# Patient Record
Sex: MALE | Race: WHITE | NOT HISPANIC OR LATINO | Employment: STUDENT | URBAN - METROPOLITAN AREA
[De-identification: names, ages, dates, MRNs, and addresses within clinical notes are randomized per-mention and may not be internally consistent; named-entity substitution may affect disease eponyms.]

---
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
## 2024-02-07 DIAGNOSIS — M25.562 LEFT KNEE PAIN, UNSPECIFIED CHRONICITY: Primary | ICD-10-CM

## 2024-02-07 NOTE — PROGRESS NOTES
History of Present Illness   He is here today for evaluation of left knee pain.  He states his pain has been going on for about 4 years.  He has not had any treatment.  He states this is an old injury when he was at [employer] running and slipped falling on his back and hyperflexing his left leg.  Since then he gets pain over the anterior knee around the patella and patellar tendons.  He states he is able to play sports however if he play sports for a day he cannot play for 3 weeks.  He has tried some ibuprofen with minimal relief.  He does note some catching and locking sensations when he is laying in bed at night.     Review of Systems   GENERAL: Negative  GI: Negative  MUSCULOSKELETAL: See HPI  SKIN: Negative  NEURO:  Negative     Physical Exam  Left knee  Skin is intact without any evidence of erythema ecchymosis or effusion  No tenderness to palpation over bony landmarks or patellar tendon however he isolates the pain when he gets it over the anterior knee and patellar tendon areas  Range of motion is 0 to 130 degrees  The knee is stable in varus valgus and anterior posterior stresses  Mildly positive Farhad's test  He is distally neurovascularly intact.     Imaging  XR knee left 4+ views  Status: Final result     PACS Images - [id number]     Show images for XR knee left 4+ views  In Basket Actions    Done  Result Mgmt     View in In Basket  Signed by    Signed Time Phone Pager   Dillon Garrett MD 2/08/2024 09:49 [phone]      Exam Information    Status Exam Begun Exam Ended   Final 2/08/2024 08:18 2/08/2024 08:24     Study Result    Narrative & Impression   Interpreted By:  Dillon Garrett,   STUDY:  XR KNEE LEFT 4+ VIEWS;  ;  2/8/2024 8:24 am      INDICATION:  Signs/Symptoms:pain.      ACCESSION NUMBER(S):  [id number]      ORDERING CLINICIAN:  DILLON GARRETT      FINDINGS:  Left knee films are negative for fracture, dislocation or destructive  lesion. The joint spaces are well maintained. No soft tissue  abnormality is  identified.          Signed by: Dillon Garrett 2/8/2024 9:49 AM  Dictation workstation:   [id number]        Assessment   Left knee pain  Left knee internal derangement     Plan  Left knee MRI  Follow-up after the MRI to go over results and formulate treatment plan.  All questions answered.

## 2024-02-08 ENCOUNTER — HOSPITAL ENCOUNTER (OUTPATIENT)
Dept: RADIOLOGY | Facility: CLINIC | Age: 19
Discharge: HOME | End: 2024-02-08
Payer: COMMERCIAL

## 2024-02-08 ENCOUNTER — OFFICE VISIT (OUTPATIENT)
Dept: ORTHOPEDIC SURGERY | Facility: CLINIC | Age: 19
End: 2024-02-08
Payer: COMMERCIAL

## 2024-02-08 DIAGNOSIS — M23.92 INTERNAL DERANGEMENT OF LEFT KNEE: ICD-10-CM

## 2024-02-08 DIAGNOSIS — M25.562 LEFT KNEE PAIN, UNSPECIFIED CHRONICITY: ICD-10-CM

## 2024-02-08 DIAGNOSIS — G89.29 CHRONIC PAIN OF LEFT KNEE: Primary | ICD-10-CM

## 2024-02-08 DIAGNOSIS — M25.562 CHRONIC PAIN OF LEFT KNEE: Primary | ICD-10-CM

## 2024-02-08 PROCEDURE — 73564 X-RAY EXAM KNEE 4 OR MORE: CPT | Mod: LT

## 2024-02-08 PROCEDURE — 1036F TOBACCO NON-USER: CPT | Performed by: ORTHOPAEDIC SURGERY

## 2024-02-08 PROCEDURE — 99203 OFFICE O/P NEW LOW 30 MIN: CPT | Performed by: ORTHOPAEDIC SURGERY

## 2024-02-08 PROCEDURE — 73564 X-RAY EXAM KNEE 4 OR MORE: CPT | Mod: LEFT SIDE | Performed by: ORTHOPAEDIC SURGERY

## 2024-02-08 PROCEDURE — 99213 OFFICE O/P EST LOW 20 MIN: CPT | Mod: 25,27 | Performed by: ORTHOPAEDIC SURGERY

## 2024-02-23 ENCOUNTER — HOSPITAL ENCOUNTER (OUTPATIENT)
Dept: RADIOLOGY | Facility: CLINIC | Age: 19
Discharge: HOME | End: 2024-02-23
Payer: COMMERCIAL

## 2024-02-23 DIAGNOSIS — G89.29 CHRONIC PAIN OF LEFT KNEE: ICD-10-CM

## 2024-02-23 DIAGNOSIS — M23.92 INTERNAL DERANGEMENT OF LEFT KNEE: ICD-10-CM

## 2024-02-23 DIAGNOSIS — M25.562 CHRONIC PAIN OF LEFT KNEE: ICD-10-CM

## 2024-02-23 PROCEDURE — 73721 MRI JNT OF LWR EXTRE W/O DYE: CPT | Mod: LEFT SIDE | Performed by: RADIOLOGY

## 2024-02-23 PROCEDURE — 73721 MRI JNT OF LWR EXTRE W/O DYE: CPT | Mod: LT

## 2024-02-29 ENCOUNTER — OFFICE VISIT (OUTPATIENT)
Dept: ORTHOPEDIC SURGERY | Facility: CLINIC | Age: 19
End: 2024-02-29
Payer: COMMERCIAL

## 2024-02-29 DIAGNOSIS — M17.12 OSTEOARTHRITIS OF LEFT PATELLOFEMORAL JOINT: Primary | ICD-10-CM

## 2024-02-29 PROCEDURE — 99214 OFFICE O/P EST MOD 30 MIN: CPT | Performed by: ORTHOPAEDIC SURGERY

## 2024-02-29 PROCEDURE — 1036F TOBACCO NON-USER: CPT | Performed by: ORTHOPAEDIC SURGERY

## 2024-02-29 PROCEDURE — 20610 DRAIN/INJ JOINT/BURSA W/O US: CPT | Mod: LT | Performed by: ORTHOPAEDIC SURGERY

## 2024-02-29 PROCEDURE — 2500000004 HC RX 250 GENERAL PHARMACY W/ HCPCS (ALT 636 FOR OP/ED): Performed by: ORTHOPAEDIC SURGERY

## 2024-02-29 PROCEDURE — 2500000005 HC RX 250 GENERAL PHARMACY W/O HCPCS: Performed by: ORTHOPAEDIC SURGERY

## 2024-02-29 RX ORDER — BETAMETHASONE SODIUM PHOSPHATE AND BETAMETHASONE ACETATE 3; 3 MG/ML; MG/ML
1 INJECTION, SUSPENSION INTRA-ARTICULAR; INTRALESIONAL; INTRAMUSCULAR; SOFT TISSUE
Status: COMPLETED | OUTPATIENT
Start: 2024-02-29 | End: 2024-02-29

## 2024-02-29 RX ORDER — LIDOCAINE HYDROCHLORIDE 10 MG/ML
1 INJECTION INFILTRATION; PERINEURAL
Status: COMPLETED | OUTPATIENT
Start: 2024-02-29 | End: 2024-02-29

## 2024-02-29 RX ORDER — MELOXICAM 15 MG/1
15 TABLET ORAL DAILY
Qty: 30 TABLET | Refills: 0 | Status: SHIPPED | OUTPATIENT
Start: 2024-02-29 | End: 2024-03-25

## 2024-02-29 RX ADMIN — BETAMETHASONE SODIUM PHOSPHATE AND BETAMETHASONE ACETATE 1 MG: 3; 3 INJECTION, SUSPENSION INTRA-ARTICULAR; INTRALESIONAL; INTRAMUSCULAR at 15:55

## 2024-02-29 RX ADMIN — LIDOCAINE HYDROCHLORIDE 1 ML: 10 INJECTION, SOLUTION INFILTRATION; PERINEURAL at 15:55

## 2024-02-29 NOTE — Clinical Note
February 29, 2024     Patient: Mal Coleman   YOB: 2005   Date of Visit: 2/29/2024       To Whom It May Concern:    It is my medical opinion that Mal Coleman {Work release (duty restriction):00238}.    If you have any questions or concerns, please don't hesitate to call.         Sincerely,        Dillon Garrett MD    CC: No Recipients

## 2024-02-29 NOTE — PROGRESS NOTES
History of Present Illness   The patient is here for follow-up of his left knee MRI.  He complains of continued pain and swelling in the left knee.     Review of Systems   GENERAL: Negative for malaise, significant weight loss, fever  MUSCULOSKELETAL: see HPI  NEURO:  Negative     Physical Exam  Left knee  The skin is intact, the extensor mechanism is intact.  There is an mild effusion, no erythema or warmth.  Range of motion: 0-130 degrees  There is some anterior knee pain on palpation.  There is no instability varus or valgus stress knee at 0 and 30 degrees     Imaging  MR knee left wo IV contrast  Status: Final result     PACS Images - [id number]     Show images for MR knee left wo IV contrast  Signed by    Signed Time Phone Pager   Hermann Farias MD 2/23/2024 09:32 [phone]     Exam Information    Status Exam Begun Exam Ended   Final 2/23/2024 08:27 2/23/2024 08:28     Study Result    Narrative & Impression   Interpreted By:  Hermann Farias,   STUDY:  MRI of the  left knee  without contrast dated  2/23/2024.      INDICATION:  Signs/Symptoms:pain      COMPARISON:  None.      ACCESSION NUMBER(S):  [id number]      ORDERING CLINICIAN:  MARY KING      TECHNIQUE:  Multiplanar multisequence MRI of the  left knee was performed  without intravenous contrast.      FINDINGS:  MUSCLES, TENDONS, AND LIGAMENTS:      The anterior cruciate ligament is intact.  The posterior cruciate  ligament is intact. The medial collateral ligament is intact.The  lateral collateral ligament complex is intact. The popliteus and  biceps femoris tendons, iliotibial band, and extensor mechanism are  intact.      MENISCI:      The medial meniscus is intact.  The lateral meniscus is intact.      OSSEOUS STRUCTURES AND JOINTS:      Reactive marrow changes are seen in the anterior lateral femoral  trochlea without associated macrotrabecular fracture line evident.  There appear to be some associated foci of irregularity to  the  subchondral bone plate with small foci of subchondral cystic change  and marginal lateral trochlear osteophyte formation there is  overlying full-thickness hyaline articular cartilage loss of the  periphery of the lateral femoral trochlea. This appears to measure in  an area of approximately 90 millimeter squared as seen image 12 in  the coronal plane. There is appositional moderate thinning of the  hyaline articular cartilage of the lateral facet of the patella  possibly with some foci of full-thickness fissuring. There is  narrowing of the lateral compartment of the patellofemoral joint  space with lateral tilting of the patella and uncovering of the  lateral facet of the patella. The tibial tubercle to trochlear groove  distance measures approximately 2 cm which is in the elevated range.  Hyaline articular cartilage of the femorotibial joint spaces is  intact. There is a small volume knee joint effusion. Intracapsular  osteochondral bodies are seen in the posterior knee joint with a  relatively large body in the infrapopliteal recess of the joint as  seen image 9 of the axial 12 of the sagittal and 35 of the coronal  planes. Reactive marrow changes seen in the anterior tibial plateau  centrally deep to the anterior cruciate ligament and anterior horn  root ligament junction of the lateral meniscus likely representing  intraosseous ganglion cyst formation.      SOFT TISSUES:      No significant volume of fluid is evident in a popliteal cyst. There  is a multilobulated septated ganglion cyst at the anterior knee at  the level of the intercondylar notch extending slightly laterally  measuring up to approximately 1.1 x 2.7 x 0.5 cm.      IMPRESSION:  1. Prominent region of full-thickness loss of hyaline articular  cartilage of the lateral femoral trochlea with appositional thinning  of hyaline articular cartilage in the lateral facet of the patella,  narrowing the lateral compartment of the patellofemoral joint  space  with lateral tilting of the patella and uncovering of the lateral  facet. The tibial tubercle to trochlear groove distance is elevated.  The constellation of these findings can be seen with disorder of  patellar maltracking.  2. Small volume knee joint effusion with intracapsular osteochondral  bodies.  3. Ganglion cyst at the anterior knee at the level of the  intercondylar notch.      Signed by: Hermann Farias 2/23/2024 9:32 AM  Dictation workstation:   [id number]        Assessment   Patellofemoral osteoarthritis     Plan:  I reviewed the MRI with the patient and his mother who are present.  I we will arrange a referral to my partner Dr. Yoon for PFOA  I offered a corticosteroid injection today which she wished to try  I advised him against playing baseball until further notice  Mobic    L Inj/Asp: L knee on 2/29/2024 3:55 PM  Indications: pain  Details: 21 G needle, anterolateral approach  Medications: 1 mL lidocaine 10 mg/mL (1 %); 1 mg betamethasone acet,sod phos 6 mg/mL  Outcome: tolerated well, no immediate complications  Procedure, treatment alternatives, risks and benefits explained, specific risks discussed. Consent was given by the patient. Immediately prior to procedure a time out was called to verify the correct patient, procedure, equipment, support staff and site/side marked as required. Patient was prepped and draped in the usual sterile fashion.       Questions answered

## 2024-02-29 NOTE — LETTER
February 29, 2024     Patient: Mal Coleman   YOB: 2005   Date of Visit: 2/29/2024       To Whom it May Concern:    Mal Coleman was seen in my clinic on 2/29/2024. He  is not able to participate in Baseball until further notice .    If you have any questions or concerns, please don't hesitate to call.         Sincerely,     Dillon Garrett MD        CC: No Recipients

## 2024-02-29 NOTE — LETTER
February 29, 2024     Patient: Mal Coleman   YOB: 2005   Date of Visit: 2/29/2024       To Whom It May Concern:    Mal Coleman was seen in my clinic on 2/29/2024 at 3:45 pm. Please excuse Mal for his absence from work on this day to make the appointment.    If you have any questions or concerns, please don't hesitate to call.         Sincerely,         iDllon Garrett MD        CC: No Recipients

## 2024-03-04 ENCOUNTER — OFFICE VISIT (OUTPATIENT)
Dept: ORTHOPEDIC SURGERY | Facility: CLINIC | Age: 19
End: 2024-03-04
Payer: COMMERCIAL

## 2024-03-04 DIAGNOSIS — M17.12 OSTEOARTHRITIS OF LEFT PATELLOFEMORAL JOINT: Primary | ICD-10-CM

## 2024-03-04 PROCEDURE — 1036F TOBACCO NON-USER: CPT | Performed by: ORTHOPAEDIC SURGERY

## 2024-03-04 PROCEDURE — 99214 OFFICE O/P EST MOD 30 MIN: CPT | Performed by: ORTHOPAEDIC SURGERY

## 2024-03-04 NOTE — PROGRESS NOTES
History of Present Illness   Chief Complaint   Patient presents with    Left Knee - New Patient Visit     PFOA ref by FS  S/p zainab inj by FS on 2/29/24       History of Present Illness   Patient has had moderate aching pain and soreness in the left knee off and on for years.  He had a remote injury but he really has been more consistent in the last several months.  He recently requires injections for short period of relief and is feeling better.  He has a grinding type sensation he is reproducibly swells up.  In the past he had reproducible mechanical issues and swelling states it takes 5 to 7 days of near complete rest is to get his knee feeling better  Imaging  Radiographs Knee: No acute fractures or dislocations.  No arthritic change and well-preserved joint space  MRI: Early lateral patellofemoral arthritis with cartilage loss over the lateral facet of the trochlea.  TT-TG is 22  Assessment:   Left knee patellofemoral malalignment with early cartilage loss in the lateral trochlea    Plan:  We reviewed the role of imaging, physical therapy, injections and the time frame to healing and correlation with outcome.  Recommended surgery for left knee arthroscopy with open tibial tubercle osteotomy/distal and proximal patellofemoral realignment    Risks benefits and alternatives to surgery were discussed including but not limited to Infection, bleeding, neurovascular injury, pain and dysfunction, hardware related complications including cutout failure breakage, loss of function, motion, and permanent disability as well as the cardiovascular and pulmonary complications from anesthesia including death and DVT. Patient and family accept these risks.  We discussed specifically hardware complication with a cut out, failure, breakage, nonunion, malunion and need for future revision surgeries including incomplete pain relief and need for future cartilage restoration as well as patellofemoral surgeries or hardware  removal      Physical Exam  Well-nourished, well-developed. No acute distress. Alert and oriented x3. Responds appropriately to questioning. Good mood. Normal affect.  Physical Exam  Left knee:  Skin healthy and intact  No gross swelling or ecchymosis  Trace to 1+ Effusion: Crepitance with range of motion  ROM: 120.  Moderate crepitus with some pain with patellar grind.  Has pain along the inferior pole patella  Positive Farhad´s test/PositiveApley Grind  Neurovascular exam normal distally  Palpable pedal pulse and good cap refill     Review of Systems   GENERAL: Negative for malaise, significant weight loss, fever  MUSCULOSKELETAL: See HPI  NEURO:  Negative     Past Medical History:   Diagnosis Date    Left knee pain 2023    Sinusitis 2023       Medication Documentation Review Audit       Reviewed by Sonam Guerra on 24 at 0827      Medication Order Taking? Sig Documenting Provider Last Dose Status   methylphenidate (Concerta) 36 mg daily tablet [id number] No Take 1 tablet (36 mg) by mouth once daily. Historical Provider, MD Taking Active   methylphenidate (Concerta) 36 mg daily tablet [id number]  Take 1 tablet (36 mg) by mouth once daily in the morning. Do not crush, chew, or split. Ana Gonzales MD   23 2359   methylphenidate (Concerta) 36 mg daily tablet [id number]  Take 1 tablet (36 mg) by mouth once daily in the morning. Do not crush, chew, or split.  Do not start before May 24, 2023. Ana Gonzales MD   23 2359   methylphenidate (Concerta) 36 mg daily tablet [id number]  Take 1 tablet (36 mg) by mouth once daily in the morning. Do not crush, chew, or split.  Do not start before 2023. Ana Gonzales MD   23 2359   methylphenidate (Concerta) 36 mg daily tablet [id number]  Take 1 tablet (36 mg) by mouth once daily in the morning. Do not crush, chew, or split. Ana Gonzales MD   23 2359   methylphenidate (Concerta) 36 mg daily tablet  [id number]  Take 1 tablet (36 mg) by mouth once daily in the morning. Do not crush, chew, or split. Do not start before 2023. Ana Gonzales MD   10/29/23 235   methylphenidate (Concerta) 36 mg daily tablet [id number]  Take 1 tablet (36 mg) by mouth once daily in the morning. Do not crush, chew, or split. Do not start before 2023. Ana Gonzales MD   23 235                    No Known Allergies    Social History     Socioeconomic History    Marital status: Single     Spouse name: Not on file    Number of children: Not on file    Years of education: Not on file    Highest education level: Not on file   Occupational History    Not on file   Tobacco Use    Smoking status: Never     Passive exposure: Never    Smokeless tobacco: Never   Substance and Sexual Activity    Alcohol use: Never    Drug use: Never    Sexual activity: Not on file   Other Topics Concern    Not on file   Social History Narrative    Not on file     Social Determinants of Health     Financial Resource Strain: Not on file   Food Insecurity: Not on file   Transportation Needs: Not on file   Physical Activity: Not on file   Stress: Not on file   Social Connections: Not on file   Intimate Partner Violence: Not on file   Housing Stability: Not on file       No past surgical history on file.    MR knee left wo IV contrast    Result Date: 2024  Interpreted By:  Hermann Farias, STUDY: MRI of the  left knee  without contrast dated  2024.   INDICATION: Signs/Symptoms:pain   COMPARISON: None.   ACCESSION NUMBER(S): [id number]   ORDERING CLINICIAN: MARY KING   TECHNIQUE: Multiplanar multisequence MRI of the  left knee was performed without intravenous contrast.   FINDINGS: MUSCLES, TENDONS, AND LIGAMENTS:   The anterior cruciate ligament is intact.  The posterior cruciate ligament is intact. The medial collateral ligament is intact.The lateral collateral ligament complex is intact. The popliteus and  biceps femoris tendons, iliotibial band, and extensor mechanism are intact.   MENISCI:   The medial meniscus is intact.  The lateral meniscus is intact.   OSSEOUS STRUCTURES AND JOINTS:   Reactive marrow changes are seen in the anterior lateral femoral trochlea without associated macrotrabecular fracture line evident. There appear to be some associated foci of irregularity to the subchondral bone plate with small foci of subchondral cystic change and marginal lateral trochlear osteophyte formation there is overlying full-thickness hyaline articular cartilage loss of the periphery of the lateral femoral trochlea. This appears to measure in an area of approximately 90 millimeter squared as seen image 12 in the coronal plane. There is appositional moderate thinning of the hyaline articular cartilage of the lateral facet of the patella possibly with some foci of full-thickness fissuring. There is narrowing of the lateral compartment of the patellofemoral joint space with lateral tilting of the patella and uncovering of the lateral facet of the patella. The tibial tubercle to trochlear groove distance measures approximately 2 cm which is in the elevated range. Hyaline articular cartilage of the femorotibial joint spaces is intact. There is a small volume knee joint effusion. Intracapsular osteochondral bodies are seen in the posterior knee joint with a relatively large body in the infrapopliteal recess of the joint as seen image 9 of the axial 12 of the sagittal and 35 of the coronal planes. Reactive marrow changes seen in the anterior tibial plateau centrally deep to the anterior cruciate ligament and anterior horn root ligament junction of the lateral meniscus likely representing intraosseous ganglion cyst formation.   SOFT TISSUES:   No significant volume of fluid is evident in a popliteal cyst. There is a multilobulated septated ganglion cyst at the anterior knee at the level of the intercondylar notch extending  slightly laterally measuring up to approximately 1.1 x 2.7 x 0.5 cm.       1. Prominent region of full-thickness loss of hyaline articular cartilage of the lateral femoral trochlea with appositional thinning of hyaline articular cartilage in the lateral facet of the patella, narrowing the lateral compartment of the patellofemoral joint space with lateral tilting of the patella and uncovering of the lateral facet. The tibial tubercle to trochlear groove distance is elevated. The constellation of these findings can be seen with disorder of patellar maltracking. 2. Small volume knee joint effusion with intracapsular osteochondral bodies. 3. Ganglion cyst at the anterior knee at the level of the intercondylar notch.   Signed by: Hermann Farias 2/23/2024 9:32 AM Dictation workstation:   [id number]    XR knee left 4+ views    Result Date: 2/8/2024  Interpreted By:  Dillon Garrett, STUDY: XR KNEE LEFT 4+ VIEWS;  ;  2/8/2024 8:24 am   INDICATION: Signs/Symptoms:pain.   ACCESSION NUMBER(S): [id number]   ORDERING CLINICIAN: DILLON GARRETT   FINDINGS: Left knee films are negative for fracture, dislocation or destructive lesion. The joint spaces are well maintained. No soft tissue abnormality is identified.     Signed by: Dillon Garrett 2/8/2024 9:49 AM Dictation workstation:   [id number]

## 2024-03-04 NOTE — LETTER
March 4, 2024     Patient: Mal Coleman   YOB: 2005   Date of Visit: 3/4/2024       To Whom It May Concern:    It is my medical opinion that Mal Coleman  will have surgery on 4/2/24 and he will be out of work for 2-3 months after .    If you have any questions or concerns, please don't hesitate to call.         Sincerely,        Russ Yoon MD    CC: No Recipients

## 2024-03-08 ENCOUNTER — HOSPITAL ENCOUNTER (OUTPATIENT)
Facility: HOSPITAL | Age: 19
Setting detail: OUTPATIENT SURGERY
End: 2024-03-08
Attending: ORTHOPAEDIC SURGERY | Admitting: ORTHOPAEDIC SURGERY
Payer: COMMERCIAL

## 2024-03-08 PROBLEM — M23.8X2 OTHER INTERNAL DERANGEMENTS OF LEFT KNEE: Status: ACTIVE | Noted: 2024-03-07

## 2024-04-12 ENCOUNTER — APPOINTMENT (OUTPATIENT)
Dept: ORTHOPEDIC SURGERY | Facility: CLINIC | Age: 19
End: 2024-04-12
Payer: COMMERCIAL

## 2024-09-16 ENCOUNTER — APPOINTMENT (OUTPATIENT)
Dept: PEDIATRICS | Facility: CLINIC | Age: 19
End: 2024-09-16
Payer: COMMERCIAL

## 2024-09-16 VITALS
DIASTOLIC BLOOD PRESSURE: 70 MMHG | TEMPERATURE: 98.1 F | HEART RATE: 59 BPM | SYSTOLIC BLOOD PRESSURE: 119 MMHG | RESPIRATION RATE: 18 BRPM | HEIGHT: 71 IN | BODY MASS INDEX: 28.84 KG/M2 | WEIGHT: 206 LBS

## 2024-09-16 DIAGNOSIS — Z00.129 ENCOUNTER FOR ROUTINE CHILD HEALTH EXAMINATION WITHOUT ABNORMAL FINDINGS: Primary | ICD-10-CM

## 2024-09-16 DIAGNOSIS — F90.0 ATTENTION DEFICIT HYPERACTIVITY DISORDER (ADHD), PREDOMINANTLY INATTENTIVE TYPE: ICD-10-CM

## 2024-09-16 DIAGNOSIS — Z00.00 HEALTH CARE MAINTENANCE: ICD-10-CM

## 2024-09-16 PROCEDURE — 1036F TOBACCO NON-USER: CPT | Performed by: PEDIATRICS

## 2024-09-16 PROCEDURE — 3008F BODY MASS INDEX DOCD: CPT | Performed by: PEDIATRICS

## 2024-09-16 PROCEDURE — 99395 PREV VISIT EST AGE 18-39: CPT | Performed by: PEDIATRICS

## 2024-09-16 RX ORDER — METHYLPHENIDATE HYDROCHLORIDE 36 MG/1
36 TABLET ORAL EVERY MORNING
Qty: 30 TABLET | Refills: 0 | Status: SHIPPED | OUTPATIENT
Start: 2024-09-16 | End: 2024-10-16

## 2024-09-16 RX ORDER — METHYLPHENIDATE HYDROCHLORIDE 36 MG/1
36 TABLET ORAL EVERY MORNING
Qty: 30 TABLET | Refills: 0 | Status: SHIPPED | OUTPATIENT
Start: 2024-10-16 | End: 2024-11-15

## 2024-09-16 RX ORDER — METHYLPHENIDATE HYDROCHLORIDE 36 MG/1
36 TABLET ORAL EVERY MORNING
Qty: 30 TABLET | Refills: 0 | Status: SHIPPED | OUTPATIENT
Start: 2024-11-15 | End: 2024-12-15

## 2024-09-16 NOTE — PROGRESS NOTES
Subjective   Mal is a 19 y.o. male who presents today for his Health Maintenance and Supervision Exam.    General Health:  Mal is overall in good health.  Concerns today: Yes- wants to restart Concerta.    Nutrition:  Balanced diet? Yes    Elimination:  Elimination patterns appropriate: Yes    Sleep:  Sleep patterns appropriate? 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   HENT:      Right Ear: Tympanic membrane normal.      Left Ear: Tympanic membrane normal.      Nose: Nose normal.      Mouth/Throat:      Pharynx: Oropharynx is clear.   Eyes:      Extraocular Movements: Extraocular movements intact.      Conjunctiva/sclera: Conjunctivae normal.      Pupils: Pupils are equal, round, and reactive to light.   Cardiovascular:      Rate and Rhythm: Regular rhythm.      Heart sounds: Normal heart sounds.   Pulmonary:      Breath sounds: Normal breath sounds.   Abdominal:      General: Abdomen is flat. Bowel sounds are normal.      Palpations: Abdomen is soft.   Musculoskeletal:         General: Normal range of motion.   Skin:     General: Skin is warm.   Neurological:      General: No focal deficit present.      Mental Status: He is alert.   Psychiatric:         Mood and Affect: Mood normal.         Assessment/Plan   Healthy 19 y.o. male child.  1. Anticipatory guidance discussed.  Safety topics reviewed.  2. No orders of the defined types were placed in this encounter.    3. Follow-up visit in 1 year for next well child visit, or sooner as needed.   4. Immunizations per order   5.Depression screen reviewed    Refilled concerta 36 mg  Sent 3 rx    He has left knee surgery scheduled for 10/30/24

## 2024-09-17 PROBLEM — M22.2X2 PATELLOFEMORAL DISORDERS, LEFT KNEE: Status: ACTIVE | Noted: 2024-09-17

## 2024-09-30 NOTE — PROGRESS NOTES
Chief Complaint   Patient presents with    Left Knee - Pre-op Visit     Arthroscopy w/ open tibial tuberales osteotomy  DOS: 10/30/24       History of Present Illness:   He notes that he has had moderate aching pain and soreness in the left knee off and on for years.  He had a remote injury but he really has been more consistent in the last several months.  He recently requires injections for short period of relief and is feeling better.  He has a grinding type sensation he is reproducibly swells up.  In the past he had reproducible mechanical issues and swelling states it takes 5 to 7 days of near complete rest is to get his knee feeling better    Imaging:  Radiographs Knee: No acute fractures or dislocations.  No arthritic change and well-preserved joint space  MRI: Early lateral patellofemoral arthritis with cartilage loss over the lateral facet of the trochlea.  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    Risks benefits and alternatives to surgery were discussed including but not limited to Infection, bleeding, neurovascular injury, pain and dysfunction, hardware related complications including cutout failure breakage, loss of function, motion, and permanent disability as well as the cardiovascular and pulmonary complications from anesthesia including death and DVT. Patient and family accept these risks.  We discussed specifically hardware complication with a cut out, failure, breakage, nonunion, malunion and need for future revision surgeries including incomplete pain relief and need for future cartilage restoration as well as patellofemoral surgeries or hardware removal    Physical Exam:  Well-nourished, well-developed. No acute distress. Alert and oriented x3. Responds appropriately to  questioning. Good mood. Normal affect.  Physical Exam  Left knee:  Skin healthy and intact  No gross swelling or ecchymosis  Trace to 1+ Effusion: Crepitance with range of motion  ROM: 120.  Moderate crepitus with some pain with patellar grind.  Has pain along the inferior pole patella  Positive Farhad´s test/PositiveApley Grind  Neurovascular exam normal distally  Palpable pedal pulse and good cap refill     Review of Systems:  GENERAL: Negative for malaise, significant weight loss, fever  MUSCULOSKELETAL: See HPI  NEURO:  Negative     Past Medical History:   Diagnosis Date    Left knee pain 04/24/2023    Sinusitis 04/24/2023       Medication Documentation Review Audit       Reviewed by Sheila Mckeon LPN (Licensed Nurse) on 09/16/24 at 1646      Medication Order Taking? Sig Documenting Provider Last Dose Status   meloxicam (Mobic) 15 mg tablet [id number]  TAKE 1 TABLET BY MOUTH EVERY DAY Peyman Mayes PA-C  Active   methylphenidate (Concerta) 36 mg daily tablet [id number] No Take 1 tablet (36 mg) by mouth once daily. Historical Provider, MD Taking Active     Discontinued 09/16/24 1644     Discontinued 09/16/24 1644     Discontinued 09/16/24 1644     Discontinued 09/16/24 1644     Discontinued 09/16/24 1644     Discontinued 09/16/24 1644                    No Known Allergies    Social History     Socioeconomic History    Marital status: Single     Spouse name: Not on file    Number of children: Not on file    Years of education: Not on file    Highest education level: Not on file   Occupational History    Not on file   Tobacco Use    Smoking status: Never     Passive exposure: Never    Smokeless tobacco: Never   Substance and Sexual Activity    Alcohol use: Never    Drug use: Never    Sexual activity: Not on file   Other Topics Concern    Not on file   Social History Narrative    Not on file     Social Determinants of Health     Financial Resource Strain: Not on file   Food Insecurity: Not on file   Transportation  Needs: Not on file   Physical Activity: Not on file   Stress: Not on file   Social Connections: Not on file   Intimate Partner Violence: Not on file   Housing Stability: Not on file       No past surgical history on file.    MR knee left wo IV contrast    Result Date: 2/23/2024  Interpreted By:  Hermann Farias, STUDY: MRI of the  left knee  without contrast dated  2/23/2024.   INDICATION: Signs/Symptoms:pain   COMPARISON: None.   ACCESSION NUMBER(S): [id number]   ORDERING CLINICIAN: MARY KING   TECHNIQUE: Multiplanar multisequence MRI of the  left knee was performed without intravenous contrast.   FINDINGS: MUSCLES, TENDONS, AND LIGAMENTS:   The anterior cruciate ligament is intact.  The posterior cruciate ligament is intact. The medial collateral ligament is intact.The lateral collateral ligament complex is intact. The popliteus and biceps femoris tendons, iliotibial band, and extensor mechanism are intact.   MENISCI:   The medial meniscus is intact.  The lateral meniscus is intact.   OSSEOUS STRUCTURES AND JOINTS:   Reactive marrow changes are seen in the anterior lateral femoral trochlea without associated macrotrabecular fracture line evident. There appear to be some associated foci of irregularity to the subchondral bone plate with small foci of subchondral cystic change and marginal lateral trochlear osteophyte formation there is overlying full-thickness hyaline articular cartilage loss of the periphery of the lateral femoral trochlea. This appears to measure in an area of approximately 90 millimeter squared as seen image 12 in the coronal plane. There is appositional moderate thinning of the hyaline articular cartilage of the lateral facet of the patella possibly with some foci of full-thickness fissuring. There is narrowing of the lateral compartment of the patellofemoral joint space with lateral tilting of the patella and uncovering of the lateral facet of the patella. The tibial tubercle to trochlear  groove distance measures approximately 2 cm which is in the elevated range. Hyaline articular cartilage of the femorotibial joint spaces is intact. There is a small volume knee joint effusion. Intracapsular osteochondral bodies are seen in the posterior knee joint with a relatively large body in the infrapopliteal recess of the joint as seen image 9 of the axial 12 of the sagittal and 35 of the coronal planes. Reactive marrow changes seen in the anterior tibial plateau centrally deep to the anterior cruciate ligament and anterior horn root ligament junction of the lateral meniscus likely representing intraosseous ganglion cyst formation.   SOFT TISSUES:   No significant volume of fluid is evident in a popliteal cyst. There is a multilobulated septated ganglion cyst at the anterior knee at the level of the intercondylar notch extending slightly laterally measuring up to approximately 1.1 x 2.7 x 0.5 cm.       1. Prominent region of full-thickness loss of hyaline articular cartilage of the lateral femoral trochlea with appositional thinning of hyaline articular cartilage in the lateral facet of the patella, narrowing the lateral compartment of the patellofemoral joint space with lateral tilting of the patella and uncovering of the lateral facet. The tibial tubercle to trochlear groove distance is elevated. The constellation of these findings can be seen with disorder of patellar maltracking. 2. Small volume knee joint effusion with intracapsular osteochondral bodies. 3. Ganglion cyst at the anterior knee at the level of the intercondylar notch.   Signed by: Hermann Farias 2/23/2024 9:32 AM Dictation workstation:   [id number]    XR knee left 4+ views    Result Date: 2/8/2024  Interpreted By:  Dillon Garrett, STUDY: XR KNEE LEFT 4+ VIEWS;  ;  2/8/2024 8:24 am   INDICATION: Signs/Symptoms:pain.   ACCESSION NUMBER(S): [id number]   ORDERING CLINICIAN: DILLON GARRETT   FINDINGS: Left knee films are negative for fracture,  dislocation or destructive lesion. The joint spaces are well maintained. No soft tissue abnormality is identified.     Signed by: Dillon Garrett 2/8/2024 9:49 AM Dictation workstation:   [id number]                            Scribe Attestation  By signing my name below, Naya DE LA GARZA Scribe   attest that this documentation has been prepared under the direction and in the presence of Russ Yoon MD.

## 2024-10-01 ENCOUNTER — OFFICE VISIT (OUTPATIENT)
Dept: ORTHOPEDIC SURGERY | Facility: CLINIC | Age: 19
End: 2024-10-01
Payer: COMMERCIAL

## 2024-10-01 DIAGNOSIS — M17.12 OSTEOARTHRITIS OF LEFT PATELLOFEMORAL JOINT: Primary | ICD-10-CM

## 2024-10-01 PROCEDURE — 99213 OFFICE O/P EST LOW 20 MIN: CPT | Performed by: ORTHOPAEDIC SURGERY

## 2024-10-03 ENCOUNTER — LAB (OUTPATIENT)
Dept: LAB | Facility: LAB | Age: 19
End: 2024-10-03
Payer: COMMERCIAL

## 2024-10-03 DIAGNOSIS — M23.8X2 OTHER INTERNAL DERANGEMENTS OF LEFT KNEE: ICD-10-CM

## 2024-10-03 DIAGNOSIS — M22.2X2 PATELLOFEMORAL DISORDERS, LEFT KNEE: ICD-10-CM

## 2024-10-03 LAB
ALBUMIN SERPL BCP-MCNC: 5.1 G/DL (ref 3.4–5)
ALP SERPL-CCNC: 98 U/L (ref 33–120)
ALT SERPL W P-5'-P-CCNC: 23 U/L (ref 10–52)
ANION GAP SERPL CALC-SCNC: 13 MMOL/L (ref 10–20)
APTT PPP: 36 SECONDS (ref 27–38)
AST SERPL W P-5'-P-CCNC: 21 U/L (ref 9–39)
BASOPHILS # BLD AUTO: 0.08 X10*3/UL (ref 0–0.1)
BASOPHILS NFR BLD AUTO: 0.8 %
BILIRUB SERPL-MCNC: 0.5 MG/DL (ref 0–1.2)
BUN SERPL-MCNC: 23 MG/DL (ref 6–23)
CALCIUM SERPL-MCNC: 10 MG/DL (ref 8.6–10.3)
CHLORIDE SERPL-SCNC: 104 MMOL/L (ref 98–107)
CO2 SERPL-SCNC: 30 MMOL/L (ref 21–32)
CREAT SERPL-MCNC: 0.94 MG/DL (ref 0.5–1.3)
EGFRCR SERPLBLD CKD-EPI 2021: >90 ML/MIN/1.73M*2
EOSINOPHIL # BLD AUTO: 0.16 X10*3/UL (ref 0–0.7)
EOSINOPHIL NFR BLD AUTO: 1.6 %
ERYTHROCYTE [DISTWIDTH] IN BLOOD BY AUTOMATED COUNT: 12.6 % (ref 11.5–14.5)
GLUCOSE SERPL-MCNC: 89 MG/DL (ref 74–99)
HCT VFR BLD AUTO: 43.3 % (ref 41–52)
HGB BLD-MCNC: 14.8 G/DL (ref 13.5–17.5)
IMM GRANULOCYTES # BLD AUTO: 0.02 X10*3/UL (ref 0–0.7)
IMM GRANULOCYTES NFR BLD AUTO: 0.2 % (ref 0–0.9)
INR PPP: 1.1 (ref 0.9–1.1)
LYMPHOCYTES # BLD AUTO: 3.18 X10*3/UL (ref 1.2–4.8)
LYMPHOCYTES NFR BLD AUTO: 31.3 %
MCH RBC QN AUTO: 28.7 PG (ref 26–34)
MCHC RBC AUTO-ENTMCNC: 34.2 G/DL (ref 32–36)
MCV RBC AUTO: 84 FL (ref 80–100)
MONOCYTES # BLD AUTO: 0.69 X10*3/UL (ref 0.1–1)
MONOCYTES NFR BLD AUTO: 6.8 %
NEUTROPHILS # BLD AUTO: 6.03 X10*3/UL (ref 1.2–7.7)
NEUTROPHILS NFR BLD AUTO: 59.3 %
NRBC BLD-RTO: 0 /100 WBCS (ref 0–0)
PLATELET # BLD AUTO: 328 X10*3/UL (ref 150–450)
POTASSIUM SERPL-SCNC: 4.6 MMOL/L (ref 3.5–5.3)
PROT SERPL-MCNC: 7.5 G/DL (ref 6.4–8.2)
PROTHROMBIN TIME: 12.3 SECONDS (ref 9.8–12.8)
RBC # BLD AUTO: 5.16 X10*6/UL (ref 4.5–5.9)
SODIUM SERPL-SCNC: 142 MMOL/L (ref 136–145)
WBC # BLD AUTO: 10.2 X10*3/UL (ref 4.4–11.3)

## 2024-10-03 PROCEDURE — 85610 PROTHROMBIN TIME: CPT

## 2024-10-03 PROCEDURE — 85025 COMPLETE CBC W/AUTO DIFF WBC: CPT

## 2024-10-03 PROCEDURE — 85730 THROMBOPLASTIN TIME PARTIAL: CPT

## 2024-10-03 PROCEDURE — 80053 COMPREHEN METABOLIC PANEL: CPT

## 2024-10-03 PROCEDURE — 36415 COLL VENOUS BLD VENIPUNCTURE: CPT

## 2024-10-29 DIAGNOSIS — M17.12 OSTEOARTHRITIS OF LEFT PATELLOFEMORAL JOINT: Primary | ICD-10-CM

## 2024-10-29 RX ORDER — OXYCODONE AND ACETAMINOPHEN 5; 325 MG/1; MG/1
1 TABLET ORAL EVERY 6 HOURS PRN
Qty: 20 TABLET | Refills: 0 | Status: SHIPPED | OUTPATIENT
Start: 2024-10-30 | End: 2024-10-31 | Stop reason: HOSPADM

## 2024-10-30 ENCOUNTER — ANESTHESIA EVENT (OUTPATIENT)
Dept: OPERATING ROOM | Facility: HOSPITAL | Age: 19
End: 2024-10-30
Payer: COMMERCIAL

## 2024-10-30 ENCOUNTER — ANESTHESIA (OUTPATIENT)
Dept: OPERATING ROOM | Facility: HOSPITAL | Age: 19
End: 2024-10-30
Payer: COMMERCIAL

## 2024-10-30 ENCOUNTER — HOSPITAL ENCOUNTER (OUTPATIENT)
Facility: HOSPITAL | Age: 19
Discharge: HOME | End: 2024-10-31
Attending: ORTHOPAEDIC SURGERY | Admitting: ORTHOPAEDIC SURGERY
Payer: COMMERCIAL

## 2024-10-30 ENCOUNTER — APPOINTMENT (OUTPATIENT)
Dept: RADIOLOGY | Facility: HOSPITAL | Age: 19
End: 2024-10-30
Payer: COMMERCIAL

## 2024-10-30 DIAGNOSIS — M22.2X2 PATELLOFEMORAL DISORDERS, LEFT KNEE: Primary | ICD-10-CM

## 2024-10-30 PROBLEM — Z98.890: Status: ACTIVE | Noted: 2024-10-30

## 2024-10-30 PROCEDURE — 2500000004 HC RX 250 GENERAL PHARMACY W/ HCPCS (ALT 636 FOR OP/ED): Performed by: STUDENT IN AN ORGANIZED HEALTH CARE EDUCATION/TRAINING PROGRAM

## 2024-10-30 PROCEDURE — 3700000002 HC GENERAL ANESTHESIA TIME - EACH INCREMENTAL 1 MINUTE: Performed by: ORTHOPAEDIC SURGERY

## 2024-10-30 PROCEDURE — 7100000002 HC RECOVERY ROOM TIME - EACH INCREMENTAL 1 MINUTE: Performed by: ORTHOPAEDIC SURGERY

## 2024-10-30 PROCEDURE — 2720000007 HC OR 272 NO HCPCS: Performed by: ORTHOPAEDIC SURGERY

## 2024-10-30 PROCEDURE — 2500000005 HC RX 250 GENERAL PHARMACY W/O HCPCS: Performed by: STUDENT IN AN ORGANIZED HEALTH CARE EDUCATION/TRAINING PROGRAM

## 2024-10-30 PROCEDURE — 2500000004 HC RX 250 GENERAL PHARMACY W/ HCPCS (ALT 636 FOR OP/ED): Mod: JZ | Performed by: ORTHOPAEDIC SURGERY

## 2024-10-30 PROCEDURE — 7100000001 HC RECOVERY ROOM TIME - INITIAL BASE CHARGE: Performed by: ORTHOPAEDIC SURGERY

## 2024-10-30 PROCEDURE — 2780000003 HC OR 278 NO HCPCS: Performed by: ORTHOPAEDIC SURGERY

## 2024-10-30 PROCEDURE — C1713 ANCHOR/SCREW BN/BN,TIS/BN: HCPCS | Performed by: ORTHOPAEDIC SURGERY

## 2024-10-30 PROCEDURE — 2500000001 HC RX 250 WO HCPCS SELF ADMINISTERED DRUGS (ALT 637 FOR MEDICARE OP): Performed by: ORTHOPAEDIC SURGERY

## 2024-10-30 PROCEDURE — 27418 REPAIR DEGENERATED KNEECAP: CPT | Performed by: ORTHOPAEDIC SURGERY

## 2024-10-30 PROCEDURE — 3600000009 HC OR TIME - EACH INCREMENTAL 1 MINUTE - PROCEDURE LEVEL FOUR: Performed by: ORTHOPAEDIC SURGERY

## 2024-10-30 PROCEDURE — 2500000004 HC RX 250 GENERAL PHARMACY W/ HCPCS (ALT 636 FOR OP/ED): Performed by: NURSE ANESTHETIST, CERTIFIED REGISTERED

## 2024-10-30 PROCEDURE — 7100000011 HC EXTENDED STAY RECOVERY HOURLY - NURSING UNIT

## 2024-10-30 PROCEDURE — 29873 ARTHRS KNEE SURG W/LAT RLS: CPT | Performed by: ORTHOPAEDIC SURGERY

## 2024-10-30 PROCEDURE — 3700000001 HC GENERAL ANESTHESIA TIME - INITIAL BASE CHARGE: Performed by: ORTHOPAEDIC SURGERY

## 2024-10-30 PROCEDURE — 2500000004 HC RX 250 GENERAL PHARMACY W/ HCPCS (ALT 636 FOR OP/ED): Mod: JZ | Performed by: PHYSICIAN ASSISTANT

## 2024-10-30 PROCEDURE — 3600000004 HC OR TIME - INITIAL BASE CHARGE - PROCEDURE LEVEL FOUR: Performed by: ORTHOPAEDIC SURGERY

## 2024-10-30 PROCEDURE — C1769 GUIDE WIRE: HCPCS | Performed by: ORTHOPAEDIC SURGERY

## 2024-10-30 PROCEDURE — 27418 REPAIR DEGENERATED KNEECAP: CPT | Performed by: PHYSICIAN ASSISTANT

## 2024-10-30 DEVICE — IMPLANTABLE DEVICE: Type: IMPLANTABLE DEVICE | Site: KNEE | Status: FUNCTIONAL

## 2024-10-30 DEVICE — BONE, PUTTY DBX  5CC DE-MINERAL ASEPTIC: Type: IMPLANTABLE DEVICE | Site: KNEE | Status: FUNCTIONAL

## 2024-10-30 RX ORDER — MEPERIDINE HYDROCHLORIDE 50 MG/ML
12.5 INJECTION INTRAMUSCULAR; INTRAVENOUS; SUBCUTANEOUS EVERY 10 MIN PRN
Status: DISCONTINUED | OUTPATIENT
Start: 2024-10-30 | End: 2024-10-30 | Stop reason: HOSPADM

## 2024-10-30 RX ORDER — ASPIRIN 81 MG/1
81 TABLET ORAL 2 TIMES DAILY
Status: DISCONTINUED | OUTPATIENT
Start: 2024-10-30 | End: 2024-10-31 | Stop reason: HOSPADM

## 2024-10-30 RX ORDER — ACETAMINOPHEN 325 MG/1
650 TABLET ORAL EVERY 6 HOURS SCHEDULED
Status: DISCONTINUED | OUTPATIENT
Start: 2024-10-30 | End: 2024-10-31 | Stop reason: HOSPADM

## 2024-10-30 RX ORDER — FENTANYL CITRATE 50 UG/ML
INJECTION, SOLUTION INTRAMUSCULAR; INTRAVENOUS AS NEEDED
Status: DISCONTINUED | OUTPATIENT
Start: 2024-10-30 | End: 2024-10-30

## 2024-10-30 RX ORDER — LABETALOL HYDROCHLORIDE 5 MG/ML
5 INJECTION, SOLUTION INTRAVENOUS ONCE AS NEEDED
Status: DISCONTINUED | OUTPATIENT
Start: 2024-10-30 | End: 2024-10-30 | Stop reason: HOSPADM

## 2024-10-30 RX ORDER — FENTANYL CITRATE 50 UG/ML
25 INJECTION, SOLUTION INTRAMUSCULAR; INTRAVENOUS EVERY 5 MIN PRN
Status: DISCONTINUED | OUTPATIENT
Start: 2024-10-30 | End: 2024-10-30 | Stop reason: HOSPADM

## 2024-10-30 RX ORDER — MIDAZOLAM HYDROCHLORIDE 1 MG/ML
INJECTION, SOLUTION INTRAMUSCULAR; INTRAVENOUS AS NEEDED
Status: DISCONTINUED | OUTPATIENT
Start: 2024-10-30 | End: 2024-10-30

## 2024-10-30 RX ORDER — CYCLOBENZAPRINE HCL 10 MG
10 TABLET ORAL 3 TIMES DAILY PRN
Status: DISCONTINUED | OUTPATIENT
Start: 2024-10-30 | End: 2024-10-31 | Stop reason: HOSPADM

## 2024-10-30 RX ORDER — ALBUTEROL SULFATE 0.83 MG/ML
2.5 SOLUTION RESPIRATORY (INHALATION) ONCE AS NEEDED
Status: DISCONTINUED | OUTPATIENT
Start: 2024-10-30 | End: 2024-10-30 | Stop reason: HOSPADM

## 2024-10-30 RX ORDER — ONDANSETRON HYDROCHLORIDE 2 MG/ML
INJECTION, SOLUTION INTRAVENOUS AS NEEDED
Status: DISCONTINUED | OUTPATIENT
Start: 2024-10-30 | End: 2024-10-30

## 2024-10-30 RX ORDER — POLYETHYLENE GLYCOL 3350 17 G/17G
17 POWDER, FOR SOLUTION ORAL DAILY
Status: DISCONTINUED | OUTPATIENT
Start: 2024-10-30 | End: 2024-10-31 | Stop reason: HOSPADM

## 2024-10-30 RX ORDER — SODIUM CHLORIDE, SODIUM LACTATE, POTASSIUM CHLORIDE, CALCIUM CHLORIDE 600; 310; 30; 20 MG/100ML; MG/100ML; MG/100ML; MG/100ML
100 INJECTION, SOLUTION INTRAVENOUS CONTINUOUS
Status: DISCONTINUED | OUTPATIENT
Start: 2024-10-30 | End: 2024-10-30 | Stop reason: HOSPADM

## 2024-10-30 RX ORDER — CEFAZOLIN SODIUM 2 G/100ML
2 INJECTION, SOLUTION INTRAVENOUS EVERY 8 HOURS
Status: COMPLETED | OUTPATIENT
Start: 2024-10-30 | End: 2024-10-31

## 2024-10-30 RX ORDER — PROCHLORPERAZINE MALEATE 10 MG
10 TABLET ORAL EVERY 6 HOURS PRN
Status: DISCONTINUED | OUTPATIENT
Start: 2024-10-30 | End: 2024-10-31 | Stop reason: HOSPADM

## 2024-10-30 RX ORDER — LIDOCAINE HYDROCHLORIDE 10 MG/ML
0.1 INJECTION, SOLUTION INFILTRATION; PERINEURAL ONCE
Status: DISCONTINUED | OUTPATIENT
Start: 2024-10-30 | End: 2024-10-30 | Stop reason: HOSPADM

## 2024-10-30 RX ORDER — PROCHLORPERAZINE EDISYLATE 5 MG/ML
10 INJECTION INTRAMUSCULAR; INTRAVENOUS EVERY 6 HOURS PRN
Status: DISCONTINUED | OUTPATIENT
Start: 2024-10-30 | End: 2024-10-31 | Stop reason: HOSPADM

## 2024-10-30 RX ORDER — PROPOFOL 10 MG/ML
INJECTION, EMULSION INTRAVENOUS AS NEEDED
Status: DISCONTINUED | OUTPATIENT
Start: 2024-10-30 | End: 2024-10-30

## 2024-10-30 RX ORDER — BISACODYL 5 MG
10 TABLET, DELAYED RELEASE (ENTERIC COATED) ORAL DAILY PRN
Status: DISCONTINUED | OUTPATIENT
Start: 2024-10-30 | End: 2024-10-31 | Stop reason: HOSPADM

## 2024-10-30 RX ORDER — WATER
150 LIQUID (ML) MISCELLANEOUS
Status: DISCONTINUED | OUTPATIENT
Start: 2024-10-30 | End: 2024-10-31 | Stop reason: HOSPADM

## 2024-10-30 RX ORDER — ONDANSETRON HYDROCHLORIDE 2 MG/ML
4 INJECTION, SOLUTION INTRAVENOUS ONCE AS NEEDED
Status: DISCONTINUED | OUTPATIENT
Start: 2024-10-30 | End: 2024-10-30 | Stop reason: HOSPADM

## 2024-10-30 RX ORDER — CEFAZOLIN SODIUM 2 G/100ML
2 INJECTION, SOLUTION INTRAVENOUS ONCE
Status: COMPLETED | OUTPATIENT
Start: 2024-10-30 | End: 2024-10-30

## 2024-10-30 RX ORDER — ONDANSETRON HYDROCHLORIDE 2 MG/ML
4 INJECTION, SOLUTION INTRAVENOUS EVERY 8 HOURS PRN
Status: DISCONTINUED | OUTPATIENT
Start: 2024-10-30 | End: 2024-10-31 | Stop reason: HOSPADM

## 2024-10-30 RX ORDER — MIDAZOLAM HYDROCHLORIDE 1 MG/ML
1 INJECTION, SOLUTION INTRAMUSCULAR; INTRAVENOUS ONCE AS NEEDED
Status: DISCONTINUED | OUTPATIENT
Start: 2024-10-30 | End: 2024-10-30 | Stop reason: HOSPADM

## 2024-10-30 RX ORDER — PROCHLORPERAZINE 25 MG/1
25 SUPPOSITORY RECTAL EVERY 12 HOURS PRN
Status: DISCONTINUED | OUTPATIENT
Start: 2024-10-30 | End: 2024-10-31 | Stop reason: HOSPADM

## 2024-10-30 RX ORDER — SODIUM CHLORIDE, SODIUM LACTATE, POTASSIUM CHLORIDE, CALCIUM CHLORIDE 600; 310; 30; 20 MG/100ML; MG/100ML; MG/100ML; MG/100ML
INJECTION, SOLUTION INTRAVENOUS CONTINUOUS PRN
Status: DISCONTINUED | OUTPATIENT
Start: 2024-10-30 | End: 2024-10-30

## 2024-10-30 RX ORDER — OXYCODONE HYDROCHLORIDE 5 MG/1
5 TABLET ORAL EVERY 4 HOURS PRN
Status: DISCONTINUED | OUTPATIENT
Start: 2024-10-30 | End: 2024-10-30 | Stop reason: HOSPADM

## 2024-10-30 RX ORDER — DIPHENHYDRAMINE HYDROCHLORIDE 50 MG/ML
12.5 INJECTION INTRAMUSCULAR; INTRAVENOUS EVERY 6 HOURS PRN
Status: DISCONTINUED | OUTPATIENT
Start: 2024-10-30 | End: 2024-10-31 | Stop reason: HOSPADM

## 2024-10-30 RX ORDER — ONDANSETRON 4 MG/1
4 TABLET, FILM COATED ORAL EVERY 8 HOURS PRN
Status: DISCONTINUED | OUTPATIENT
Start: 2024-10-30 | End: 2024-10-31 | Stop reason: HOSPADM

## 2024-10-30 RX ORDER — LIDOCAINE HYDROCHLORIDE 20 MG/ML
INJECTION, SOLUTION EPIDURAL; INFILTRATION; INTRACAUDAL; PERINEURAL AS NEEDED
Status: DISCONTINUED | OUTPATIENT
Start: 2024-10-30 | End: 2024-10-30

## 2024-10-30 SDOH — SOCIAL STABILITY: SOCIAL INSECURITY: HAVE YOU HAD THOUGHTS OF HARMING ANYONE ELSE?: NO

## 2024-10-30 SDOH — ECONOMIC STABILITY: FOOD INSECURITY: WITHIN THE PAST 12 MONTHS, YOU WORRIED THAT YOUR FOOD WOULD RUN OUT BEFORE YOU GOT THE MONEY TO BUY MORE.: NEVER TRUE

## 2024-10-30 SDOH — ECONOMIC STABILITY: INCOME INSECURITY: IN THE PAST 12 MONTHS HAS THE ELECTRIC, GAS, OIL, OR WATER COMPANY THREATENED TO SHUT OFF SERVICES IN YOUR HOME?: NO

## 2024-10-30 SDOH — ECONOMIC STABILITY: FOOD INSECURITY: HOW HARD IS IT FOR YOU TO PAY FOR THE VERY BASICS LIKE FOOD, HOUSING, MEDICAL CARE, AND HEATING?: NOT HARD AT ALL

## 2024-10-30 SDOH — SOCIAL STABILITY: SOCIAL INSECURITY: WITHIN THE LAST YEAR, HAVE YOU BEEN AFRAID OF YOUR PARTNER OR EX-PARTNER?: NO

## 2024-10-30 SDOH — ECONOMIC STABILITY: HOUSING INSECURITY: AT ANY TIME IN THE PAST 12 MONTHS, WERE YOU HOMELESS OR LIVING IN A SHELTER (INCLUDING NOW)?: NO

## 2024-10-30 SDOH — SOCIAL STABILITY: SOCIAL INSECURITY
WITHIN THE LAST YEAR, HAVE YOU BEEN KICKED, HIT, SLAPPED, OR OTHERWISE PHYSICALLY HURT BY YOUR PARTNER OR EX-PARTNER?: NO

## 2024-10-30 SDOH — SOCIAL STABILITY: SOCIAL INSECURITY: HAVE YOU HAD ANY THOUGHTS OF HARMING ANYONE ELSE?: NO

## 2024-10-30 SDOH — SOCIAL STABILITY: SOCIAL INSECURITY: DO YOU FEEL ANYONE HAS EXPLOITED OR TAKEN ADVANTAGE OF YOU FINANCIALLY OR OF YOUR PERSONAL PROPERTY?: NO

## 2024-10-30 SDOH — SOCIAL STABILITY: SOCIAL INSECURITY: WITHIN THE LAST YEAR, HAVE YOU BEEN HUMILIATED OR EMOTIONALLY ABUSED IN OTHER WAYS BY YOUR PARTNER OR EX-PARTNER?: NO

## 2024-10-30 SDOH — SOCIAL STABILITY: SOCIAL INSECURITY: ABUSE: ADULT

## 2024-10-30 SDOH — SOCIAL STABILITY: SOCIAL INSECURITY: DO YOU FEEL UNSAFE GOING BACK TO THE PLACE WHERE YOU ARE LIVING?: NO

## 2024-10-30 SDOH — SOCIAL STABILITY: SOCIAL INSECURITY
WITHIN THE LAST YEAR, HAVE YOU BEEN RAPED OR FORCED TO HAVE ANY KIND OF SEXUAL ACTIVITY BY YOUR PARTNER OR EX-PARTNER?: NO

## 2024-10-30 SDOH — HEALTH STABILITY: MENTAL HEALTH: CURRENT SMOKER: 0

## 2024-10-30 SDOH — ECONOMIC STABILITY: HOUSING INSECURITY: IN THE LAST 12 MONTHS, WAS THERE A TIME WHEN YOU WERE NOT ABLE TO PAY THE MORTGAGE OR RENT ON TIME?: NO

## 2024-10-30 SDOH — SOCIAL STABILITY: SOCIAL INSECURITY: ARE YOU OR HAVE YOU BEEN THREATENED OR ABUSED PHYSICALLY, EMOTIONALLY, OR SEXUALLY BY ANYONE?: NO

## 2024-10-30 SDOH — ECONOMIC STABILITY: TRANSPORTATION INSECURITY: IN THE PAST 12 MONTHS, HAS LACK OF TRANSPORTATION KEPT YOU FROM MEDICAL APPOINTMENTS OR FROM GETTING MEDICATIONS?: NO

## 2024-10-30 SDOH — ECONOMIC STABILITY: FOOD INSECURITY: WITHIN THE PAST 12 MONTHS, THE FOOD YOU BOUGHT JUST DIDN'T LAST AND YOU DIDN'T HAVE MONEY TO GET MORE.: NEVER TRUE

## 2024-10-30 SDOH — SOCIAL STABILITY: SOCIAL INSECURITY: HAS ANYONE EVER THREATENED TO HURT YOUR FAMILY OR YOUR PETS?: NO

## 2024-10-30 SDOH — ECONOMIC STABILITY: HOUSING INSECURITY: IN THE PAST 12 MONTHS, HOW MANY TIMES HAVE YOU MOVED WHERE YOU WERE LIVING?: 1

## 2024-10-30 SDOH — SOCIAL STABILITY: SOCIAL INSECURITY: ARE THERE ANY APPARENT SIGNS OF INJURIES/BEHAVIORS THAT COULD BE RELATED TO ABUSE/NEGLECT?: NO

## 2024-10-30 SDOH — SOCIAL STABILITY: SOCIAL INSECURITY: DOES ANYONE TRY TO KEEP YOU FROM HAVING/CONTACTING OTHER FRIENDS OR DOING THINGS OUTSIDE YOUR HOME?: NO

## 2024-10-30 ASSESSMENT — LIFESTYLE VARIABLES
AUDIT-C TOTAL SCORE: 1
HOW MANY STANDARD DRINKS CONTAINING ALCOHOL DO YOU HAVE ON A TYPICAL DAY: 1 OR 2
AUDIT-C TOTAL SCORE: 1
SKIP TO QUESTIONS 9-10: 1
HOW OFTEN DO YOU HAVE 6 OR MORE DRINKS ON ONE OCCASION: NEVER
HOW OFTEN DO YOU HAVE A DRINK CONTAINING ALCOHOL: MONTHLY OR LESS

## 2024-10-30 ASSESSMENT — PAIN DESCRIPTION - ORIENTATION: ORIENTATION: LEFT

## 2024-10-30 ASSESSMENT — ACTIVITIES OF DAILY LIVING (ADL)
DRESSING YOURSELF: INDEPENDENT
LACK_OF_TRANSPORTATION: NO
HEARING - RIGHT EAR: FUNCTIONAL
TOILETING: INDEPENDENT
ADEQUATE_TO_COMPLETE_ADL: YES
WALKS IN HOME: INDEPENDENT
LACK_OF_TRANSPORTATION: NO
PATIENT'S MEMORY ADEQUATE TO SAFELY COMPLETE DAILY ACTIVITIES?: YES
HEARING - LEFT EAR: FUNCTIONAL
FEEDING YOURSELF: INDEPENDENT
BATHING: INDEPENDENT
GROOMING: INDEPENDENT
JUDGMENT_ADEQUATE_SAFELY_COMPLETE_DAILY_ACTIVITIES: YES

## 2024-10-30 ASSESSMENT — PATIENT HEALTH QUESTIONNAIRE - PHQ9
1. LITTLE INTEREST OR PLEASURE IN DOING THINGS: NOT AT ALL
SUM OF ALL RESPONSES TO PHQ9 QUESTIONS 1 & 2: 0
2. FEELING DOWN, DEPRESSED OR HOPELESS: NOT AT ALL

## 2024-10-30 ASSESSMENT — COLUMBIA-SUICIDE SEVERITY RATING SCALE - C-SSRS
2. HAVE YOU ACTUALLY HAD ANY THOUGHTS OF KILLING YOURSELF?: NO
1. IN THE PAST MONTH, HAVE YOU WISHED YOU WERE DEAD OR WISHED YOU COULD GO TO SLEEP AND NOT WAKE UP?: NO
6. HAVE YOU EVER DONE ANYTHING, STARTED TO DO ANYTHING, OR PREPARED TO DO ANYTHING TO END YOUR LIFE?: NO

## 2024-10-30 ASSESSMENT — PAIN - FUNCTIONAL ASSESSMENT
PAIN_FUNCTIONAL_ASSESSMENT: 0-10

## 2024-10-30 ASSESSMENT — PAIN SCALES - GENERAL
PAIN_LEVEL: 0
PAINLEVEL_OUTOF10: 0 - NO PAIN
PAINLEVEL_OUTOF10: 4
PAINLEVEL_OUTOF10: 0 - NO PAIN
PAINLEVEL_OUTOF10: 0 - NO PAIN
PAINLEVEL_OUTOF10: 6
PAINLEVEL_OUTOF10: 8
PAINLEVEL_OUTOF10: 5 - MODERATE PAIN
PAINLEVEL_OUTOF10: 1
PAINLEVEL_OUTOF10: 0 - NO PAIN
PAINLEVEL_OUTOF10: 1

## 2024-10-30 ASSESSMENT — COGNITIVE AND FUNCTIONAL STATUS - GENERAL
MOVING TO AND FROM BED TO CHAIR: A LITTLE
STANDING UP FROM CHAIR USING ARMS: A LITTLE
MOBILITY SCORE: 20
CLIMB 3 TO 5 STEPS WITH RAILING: A LITTLE
DAILY ACTIVITIY SCORE: 24
PATIENT BASELINE BEDBOUND: NO
WALKING IN HOSPITAL ROOM: A LITTLE

## 2024-10-30 ASSESSMENT — PAIN DESCRIPTION - LOCATION: LOCATION: KNEE

## 2024-10-31 ENCOUNTER — PHARMACY VISIT (OUTPATIENT)
Dept: PHARMACY | Facility: CLINIC | Age: 19
End: 2024-10-31
Payer: MEDICARE

## 2024-10-31 VITALS
HEART RATE: 54 BPM | HEIGHT: 71 IN | WEIGHT: 208 LBS | SYSTOLIC BLOOD PRESSURE: 131 MMHG | TEMPERATURE: 97.9 F | BODY MASS INDEX: 29.12 KG/M2 | RESPIRATION RATE: 14 BRPM | OXYGEN SATURATION: 97 % | DIASTOLIC BLOOD PRESSURE: 68 MMHG

## 2024-10-31 PROCEDURE — 2500000001 HC RX 250 WO HCPCS SELF ADMINISTERED DRUGS (ALT 637 FOR MEDICARE OP): Performed by: ORTHOPAEDIC SURGERY

## 2024-10-31 PROCEDURE — 97116 GAIT TRAINING THERAPY: CPT | Mod: GP

## 2024-10-31 PROCEDURE — 7100000011 HC EXTENDED STAY RECOVERY HOURLY - NURSING UNIT

## 2024-10-31 PROCEDURE — 2500000004 HC RX 250 GENERAL PHARMACY W/ HCPCS (ALT 636 FOR OP/ED): Performed by: ORTHOPAEDIC SURGERY

## 2024-10-31 PROCEDURE — RXMED WILLOW AMBULATORY MEDICATION CHARGE

## 2024-10-31 PROCEDURE — 2500000001 HC RX 250 WO HCPCS SELF ADMINISTERED DRUGS (ALT 637 FOR MEDICARE OP): Performed by: PHYSICIAN ASSISTANT

## 2024-10-31 PROCEDURE — 97161 PT EVAL LOW COMPLEX 20 MIN: CPT | Mod: GP

## 2024-10-31 RX ORDER — CYCLOBENZAPRINE HCL 10 MG
10 TABLET ORAL 3 TIMES DAILY PRN
Qty: 12 TABLET | Refills: 0 | Status: SHIPPED | OUTPATIENT
Start: 2024-10-31 | End: 2024-11-04

## 2024-10-31 RX ORDER — OXYCODONE HYDROCHLORIDE 5 MG/1
5 TABLET ORAL EVERY 6 HOURS PRN
Qty: 20 TABLET | Refills: 0 | Status: SHIPPED | OUTPATIENT
Start: 2024-10-31 | End: 2024-11-05

## 2024-10-31 RX ORDER — ASPIRIN 81 MG/1
81 TABLET ORAL DAILY
Qty: 14 TABLET | Refills: 0 | Status: SHIPPED | OUTPATIENT
Start: 2024-10-31 | End: 2024-11-14

## 2024-10-31 RX ORDER — OXYCODONE HYDROCHLORIDE 5 MG/1
7.5 TABLET ORAL EVERY 4 HOURS PRN
Status: DISCONTINUED | OUTPATIENT
Start: 2024-10-31 | End: 2024-10-31 | Stop reason: HOSPADM

## 2024-10-31 RX ORDER — OXYCODONE HYDROCHLORIDE 5 MG/1
5 TABLET ORAL EVERY 4 HOURS PRN
Status: DISCONTINUED | OUTPATIENT
Start: 2024-10-31 | End: 2024-10-31 | Stop reason: HOSPADM

## 2024-10-31 ASSESSMENT — PAIN DESCRIPTION - ORIENTATION
ORIENTATION: LEFT
ORIENTATION: LEFT

## 2024-10-31 ASSESSMENT — ACTIVITIES OF DAILY LIVING (ADL): ADL_ASSISTANCE: INDEPENDENT

## 2024-10-31 ASSESSMENT — COGNITIVE AND FUNCTIONAL STATUS - GENERAL
MOBILITY SCORE: 20
TOILETING: A LITTLE
MOBILITY SCORE: 23
MOBILITY SCORE: 21
STANDING UP FROM CHAIR USING ARMS: A LITTLE
MOVING TO AND FROM BED TO CHAIR: A LITTLE
HELP NEEDED FOR BATHING: A LITTLE
CLIMB 3 TO 5 STEPS WITH RAILING: A LITTLE
WALKING IN HOSPITAL ROOM: A LITTLE
STANDING UP FROM CHAIR USING ARMS: A LITTLE
WALKING IN HOSPITAL ROOM: A LITTLE
DRESSING REGULAR UPPER BODY CLOTHING: A LITTLE
DAILY ACTIVITIY SCORE: 19
DRESSING REGULAR LOWER BODY CLOTHING: A LITTLE
PERSONAL GROOMING: A LITTLE
CLIMB 3 TO 5 STEPS WITH RAILING: A LITTLE
CLIMB 3 TO 5 STEPS WITH RAILING: A LITTLE

## 2024-10-31 ASSESSMENT — PAIN SCALES - GENERAL
PAINLEVEL_OUTOF10: 5 - MODERATE PAIN
PAINLEVEL_OUTOF10: 3
PAINLEVEL_OUTOF10: 6
PAINLEVEL_OUTOF10: 5 - MODERATE PAIN
PAINLEVEL_OUTOF10: 2
PAINLEVEL_OUTOF10: 6

## 2024-10-31 ASSESSMENT — PAIN - FUNCTIONAL ASSESSMENT
PAIN_FUNCTIONAL_ASSESSMENT: 0-10

## 2024-10-31 ASSESSMENT — PAIN DESCRIPTION - LOCATION
LOCATION: KNEE
LOCATION: KNEE

## 2024-11-08 ENCOUNTER — OFFICE VISIT (OUTPATIENT)
Dept: ORTHOPEDIC SURGERY | Facility: CLINIC | Age: 19
End: 2024-11-08
Payer: COMMERCIAL

## 2024-11-08 ENCOUNTER — HOSPITAL ENCOUNTER (OUTPATIENT)
Dept: RADIOLOGY | Facility: CLINIC | Age: 19
Discharge: HOME | End: 2024-11-08
Payer: COMMERCIAL

## 2024-11-08 DIAGNOSIS — M17.12 OSTEOARTHRITIS OF LEFT PATELLOFEMORAL JOINT: ICD-10-CM

## 2024-11-08 PROCEDURE — 73562 X-RAY EXAM OF KNEE 3: CPT | Mod: LT

## 2024-11-08 PROCEDURE — 99211 OFF/OP EST MAY X REQ PHY/QHP: CPT | Performed by: PHYSICIAN ASSISTANT

## 2024-11-08 NOTE — PROGRESS NOTES
History of Present Illness:  Date of Surgery: 10/30/24.  Status post left knee scope with open tibial tubercle osteotomy. Patient is doing well today with no concerns.     Imaging:  X-ray left knee status post TTO with good alignment of hardware    Exam:  Mild effusion  Left knee incisions clean, dry, intact, healing well without drainage.   Left knee range of motion intentionally not tested today  No calf swelling   Negative Vivien's test  Distal neurovascular exam intact    Assessment:  Patient status post left knee TTO    Plan:  Reviewed arthroscopic photos and findings  Will have the patient remain in the recovery brace locked out straight nonweightbearing until 2 weeks postop, at 2 weeks he can unlock the brace allowing 0 to 90 degrees of flexion while remaining nonweightbearing until 6 weeks postop  Follow-up 4 weeks

## 2024-11-11 DIAGNOSIS — M22.2X2 PATELLOFEMORAL DISORDERS, LEFT KNEE: ICD-10-CM

## 2024-11-11 RX ORDER — OXYCODONE HYDROCHLORIDE 5 MG/1
5 TABLET ORAL EVERY 6 HOURS PRN
Qty: 20 TABLET | Refills: 0 | Status: SHIPPED | OUTPATIENT
Start: 2024-11-11 | End: 2024-11-16

## 2024-12-04 NOTE — PROGRESS NOTES
History of Present Illness:  Date of Surgery: 10/30/24.  Status post left knee scope with open tibial tubercle osteotomy. Overall his knee is doing well with no concerns.     Imaging:  X-ray left knee status post TTO with good alignment of hardware    Exam:  Mild effusion  Left knee incisions clean, dry, intact, healing well without drainage.   Left knee range of motion intentionally not tested today  No calf swelling   Negative Vivien's test  Distal neurovascular exam intact    Assessment:  Patient status post left knee TTO    Plan:  Reviewed arthroscopic photos and findings  nonweightbearing until 6 weeks postop, TTO protocol  Follow-up 4 weeks    Scribe Attestation  By signing my name below, INaya Scribe   attest that this documentation has been prepared under the direction and in the presence of Russ Yoon MD.

## 2024-12-06 ENCOUNTER — HOSPITAL ENCOUNTER (OUTPATIENT)
Dept: RADIOLOGY | Facility: CLINIC | Age: 19
Discharge: HOME | End: 2024-12-06
Payer: COMMERCIAL

## 2024-12-06 ENCOUNTER — OFFICE VISIT (OUTPATIENT)
Dept: ORTHOPEDIC SURGERY | Facility: CLINIC | Age: 19
End: 2024-12-06
Payer: COMMERCIAL

## 2024-12-06 DIAGNOSIS — Z98.890 S/P OSTEOTOMY OF LEFT TIBIAL TUBEROSITY: ICD-10-CM

## 2024-12-06 PROCEDURE — 99211 OFF/OP EST MAY X REQ PHY/QHP: CPT | Performed by: ORTHOPAEDIC SURGERY

## 2024-12-06 PROCEDURE — 73560 X-RAY EXAM OF KNEE 1 OR 2: CPT | Mod: LT

## 2025-01-06 NOTE — PROGRESS NOTES
History of Present Illness:  Date of Surgery: 10/30/24.  Status post left knee scope with open tibial tubercle osteotomy. He is doing well with no concerns. He has been doing therapy on his own.     Imaging:  X-ray left knee status post TTO with good alignment of hardware    Exam:  Mild effusion  Left knee incisions well healed  Full passive motion   Left knee range of motion intentionally not tested today  No calf swelling   Negative Vivien's test  Distal neurovascular exam intact    Assessment:  S/p left knee TTO    Plan:  Start working with formal therapy  Follow-up 4-6 weeks with repeat x-rays    Scribe Attestation  By signing my name below, Naya DE LA GARZA Scribe   attest that this documentation has been prepared under the direction and in the presence of Russ Yoon MD.

## 2025-01-07 ENCOUNTER — OFFICE VISIT (OUTPATIENT)
Dept: ORTHOPEDIC SURGERY | Facility: CLINIC | Age: 20
End: 2025-01-07
Payer: COMMERCIAL

## 2025-01-07 ENCOUNTER — HOSPITAL ENCOUNTER (OUTPATIENT)
Dept: RADIOLOGY | Facility: CLINIC | Age: 20
Discharge: HOME | End: 2025-01-07
Payer: COMMERCIAL

## 2025-01-07 DIAGNOSIS — Z98.890 S/P OSTEOTOMY OF LEFT TIBIAL TUBEROSITY: ICD-10-CM

## 2025-01-07 PROCEDURE — 73560 X-RAY EXAM OF KNEE 1 OR 2: CPT | Mod: LEFT SIDE | Performed by: ORTHOPAEDIC SURGERY

## 2025-01-07 PROCEDURE — 99211 OFF/OP EST MAY X REQ PHY/QHP: CPT | Performed by: ORTHOPAEDIC SURGERY

## 2025-01-07 PROCEDURE — 73560 X-RAY EXAM OF KNEE 1 OR 2: CPT | Mod: LT

## 2025-02-11 ENCOUNTER — APPOINTMENT (OUTPATIENT)
Dept: ORTHOPEDIC SURGERY | Facility: CLINIC | Age: 20
End: 2025-02-11
Payer: COMMERCIAL

## 2025-02-12 NOTE — PROGRESS NOTES
History of Present Illness:  Date of Surgery: 10/30/24.  Status post left knee scope with open tibial tubercle osteotomy. Overall he is doing well with no concerns. He has been doing therapy on his own.     Imaging:  X-ray left knee: Show healing TTO    Exam:  Mild effusion  Left knee incisions well healed  Full passive motion   Left knee range of motion intentionally not tested today  No calf swelling   Negative Vivien's test  Distal neurovascular exam intact    Assessment:  S/p left knee TTO    Plan:  Start working with formal therapy at [facility]  Follow-up in 2 months with repeat x-rays    Past Medical History:   Diagnosis Date    ADHD (attention deficit hyperactivity disorder)     Left knee pain 2023    Sinusitis 2023       Medication Documentation Review Audit       Reviewed by Guerline Moore RN (Registered Nurse) on 10/30/24 at 1840      Medication Order Taking? Sig Documenting Provider Last Dose Status   meloxicam (Mobic) 15 mg tablet [id number] No TAKE 1 TABLET BY MOUTH EVERY DAY   Patient not taking: Reported on 10/24/2024    Peyman Mayes PA-C Not Taking Active   methylphenidate ER (Concerta) 36 mg extended release tablet [id number]  Take 1 tablet (36 mg) by mouth once daily in the morning. Do not crush, chew, or split. Ana Gonzales MD   10/16/24 2359   methylphenidate ER (Concerta) 36 mg extended release tablet [id number] Yes Take 1 tablet (36 mg) by mouth once daily in the morning. Do not crush, chew, or split. Do not fill before 2024. Ana Gonzales MD Past Week Active   methylphenidate ER (Concerta) 36 mg extended release tablet [id number] No Take 1 tablet (36 mg) by mouth once daily in the morning. Do not crush, chew, or split. Do not fill before November 15, 2024.   Patient not taking: Reported on 10/30/2024 Do not fill before November 15, 2024.    Ana Gonzales MD Not Taking Active   oxyCODONE-acetaminophen (Percocet) 5-325 mg tablet [id number] No  Take 1 tablet by mouth every 6 hours if needed for severe pain (7 - 10) for up to 7 days. Do not fill before October 30, 2024.   Patient not taking: Reported on 10/30/2024    Octavia Chino PA-C Not Taking Active                    No Known Allergies    Social History     Socioeconomic History    Marital status: Single     Spouse name: Not on file    Number of children: Not on file    Years of education: Not on file    Highest education level: Not on file   Occupational History    Not on file   Tobacco Use    Smoking status: Never     Passive exposure: Never    Smokeless tobacco: Never   Vaping Use    Vaping status: Never Used   Substance and Sexual Activity    Alcohol use: Yes     Comment: socially    Drug use: Never    Sexual activity: Defer   Other Topics Concern    Not on file   Social History Narrative    Not on file     Social Drivers of Health     Financial Resource Strain: Low Risk  (10/30/2024)    Overall Financial Resource Strain (CARDIA)     Difficulty of Paying Living Expenses: Not hard at all   Food Insecurity: No Food Insecurity (10/30/2024)    Hunger Vital Sign     Worried About Running Out of Food in the Last Year: Never true     Ran Out of Food in the Last Year: Never true   Transportation Needs: No Transportation Needs (10/30/2024)    PRAPARE - Transportation     Lack of Transportation (Medical): No     Lack of Transportation (Non-Medical): No   Physical Activity: Not on file   Stress: Not on file   Social Connections: Not on file   Intimate Partner Violence: Not At Risk (10/30/2024)    Humiliation, Afraid, Rape, and Kick questionnaire     Fear of Current or Ex-Partner: No     Emotionally Abused: No     Physically Abused: No     Sexually Abused: No   Housing Stability: Low Risk  (10/30/2024)    Housing Stability Vital Sign     Unable to Pay for Housing in the Last Year: No     Number of Times Moved in the Last Year: 1     Homeless in the Last Year: No       No past surgical history on  file.    Scribe Attestation  By signing my name below, INaya Scribe   attest that this documentation has been prepared under the direction and in the presence of Russ Yoon MD.

## 2025-02-14 ENCOUNTER — OFFICE VISIT (OUTPATIENT)
Dept: ORTHOPEDIC SURGERY | Facility: CLINIC | Age: 20
End: 2025-02-14
Payer: COMMERCIAL

## 2025-02-14 ENCOUNTER — HOSPITAL ENCOUNTER (OUTPATIENT)
Dept: RADIOLOGY | Facility: CLINIC | Age: 20
Discharge: HOME | End: 2025-02-14
Payer: COMMERCIAL

## 2025-02-14 DIAGNOSIS — Z98.890 S/P OSTEOTOMY OF LEFT TIBIAL TUBEROSITY: ICD-10-CM

## 2025-02-14 PROCEDURE — 73560 X-RAY EXAM OF KNEE 1 OR 2: CPT | Mod: LT

## 2025-02-14 PROCEDURE — 99213 OFFICE O/P EST LOW 20 MIN: CPT | Performed by: ORTHOPAEDIC SURGERY

## 2025-03-12 ENCOUNTER — EVALUATION (OUTPATIENT)
Dept: PHYSICAL THERAPY | Facility: CLINIC | Age: 20
End: 2025-03-12
Payer: COMMERCIAL

## 2025-03-12 DIAGNOSIS — Z98.890 S/P OSTEOTOMY OF LEFT TIBIAL TUBEROSITY: ICD-10-CM

## 2025-03-12 DIAGNOSIS — M22.2X2 PATELLOFEMORAL DISORDERS, LEFT KNEE: Primary | ICD-10-CM

## 2025-03-12 PROCEDURE — 97535 SELF CARE MNGMENT TRAINING: CPT | Mod: GP | Performed by: PHYSICAL THERAPIST

## 2025-03-12 PROCEDURE — 97110 THERAPEUTIC EXERCISES: CPT | Mod: GP | Performed by: PHYSICAL THERAPIST

## 2025-03-12 PROCEDURE — 97161 PT EVAL LOW COMPLEX 20 MIN: CPT | Mod: GP | Performed by: PHYSICAL THERAPIST

## 2025-03-12 ASSESSMENT — PAIN SCALES - GENERAL: PAINLEVEL_OUTOF10: 0 - NO PAIN

## 2025-03-12 ASSESSMENT — PAIN - FUNCTIONAL ASSESSMENT: PAIN_FUNCTIONAL_ASSESSMENT: 0-10

## 2025-03-12 NOTE — LETTER
March 12, 2025    Caleb James, PT  [address] [place], [address] [place] [address]    Patient: Mal Coleman   YOB: 2005   Date of Visit: 3/12/2025       Dear Russ Yoon MD  [address]   [facility], [address] [place] [address],  [place] [address]    The attached plan of care is being sent to you because your patient’s medical reimbursement requires that you certify the plan of care. Your signature is required to allow uninterrupted insurance coverage.      You may indicate your approval by signing below and faxing this form back to us at Dept Fax: [phone].    Please call Dept: [phone] with any questions or concerns.    Thank you for this referral,        Caleb James, PT  [place] [address] [facility]  [address] [facility] [address]    Payer: Payor: ANTHEM / Plan: ANTHEM HMP / Product Type: *No Product type* /                                                                         Date:     Dear Caleb James, PT,     Re: Mr. Mal Coleman, MRN:[id number]    I certify that I have reviewed the attached plan of care and it is medically necessary for Mr. Mal Coleman (2005) who is under my care.          ______________________________________                    _________________  Provider name and credentials                                           Date and time                                                                                           Plan of Care 3/12/25   Effective from: 3/12/2025  Effective to: 6/10/2025    Plan ID: [id number]            Participants as of Finalize on 3/12/2025    Name Type Comments Contact Info    Russ Yoon MD Referring Provider  [phone]    Caleb James PT Physical Therapist  [phone]       Last Plan Note     Author: Caleb James PT Status: Incomplete Last edited: 3/12/2025  2:45 PM       PT Initial Evaluation    Patient  Name:  Mal Coleman    MRN:  [id number]    :  2005    Today's Date:  25    Time Calculation  Start Time: 1445  Stop Time: 1523  Time Calculation (min): 38 min  PT Evaluation Time Entry  PT Evaluation (Low) Time Entry: 15  PT Therapeutic Procedures Time Entry  Therapeutic Exercise Time Entry: 8  Self-Care/Home Mgmt Training: 15       Informed Consent  Patient has been informed of all evaluation findings and treatment plans and agrees to participate in Physical Therapy services and plans as outlined.    Diagnosis:  Diagnosis and Precautions: Z98.890 (ICD-10-CM) - S/P osteotomy of left tibial tuberosity; ARTHROSCOPY DIAGNOSTIC KNEE WITH OPEN TIBIAL TUBERCLE OSTEOTOMY 10/30/2024 Dr. Yoon; patellofemoral disorder M22.2    Goals:   1. Strength and Endurance  Goal: Within 6 weeks, the patient’s quadriceps and hamstring strength will improve from 4/5 to 5/5 on the Manual Muscle Test (MMT), allowing performance of 10 controlled squats with proper form and pain <=3/10.  2. Neuromuscular Control, Proprioception, and Coordination  Goal: Within 4 weeks, the patient will demonstrate improved knee stability by maintaining single-leg balance on the affected side for 10 seconds without compensatory movements or excessive wobble.  3. Sport-Specific Goals  Goal: Within 7-8 weeks, the patient will return to sport-specific drills (e.g., running, agility exercises) with proper technique and <=2/10 pain.  4. Education, Self-Management, and Compliance  Goal: By the second therapy visit, the patient will accurately demonstrate their prescribed knee home exercise program (HEP), understanding proper form, frequency, and how to modify exercises if pain or fatigue increases.  5. Long-Term Maintenance and Prevention  Goal: By discharge (approximately 8 weeks), the patient will establish a regular knee exercise routine, maintain proper mechanics during daily tasks and sports, and experience no significant pain flare-ups (>3/10)  during usual activities.      Plan of Care:      Treatment/Interventions: Cryotherapy, Education/ Instruction, Gait training, Manual therapy, Neuromuscular re-education, Self care/ home management, Therapeutic activities, Therapeutic exercises, Vasopneumatic device  PT Plan: Skilled PT  PT Frequency: 1 time per week  Duration: 7 visits     Certification Period Start Date: 03/12/25  Certification Period End Date: 06/10/25  Number of Treatments Authorized: 7  Rehab Potential: Good  Plan of Care Agreement: Patient    PT Assessment:    Patient is a 20 y.o. MALE with c/o L knee pain.   Patient is alert and oriented x 3.  Patient presents with medical diagnosis of Z98.890 (ICD-10-CM) - S/P osteotomy of left tibial tuberosity; ARTHROSCOPY DIAGNOSTIC KNEE WITH OPEN TIBIAL TUBERCLE OSTEOTOMY 10/30/2024 Dr. Yoon; patellofemoral disorder M22.2  contributing to compensatory soft tissue dysfunction, pain, stiffness and weakness of the L knee.   Significant past medical history/past surgical history includes ADD.    Skilled care is needed to progress the patient back to these activities without exacerbating symptoms.   Patient requires skilled PT services to address the problems identified and the individualized patient's goals as outlined in the problems and goals section of this evaluation.  A skilled PT is required to address these key impairments and to provide and progress with an appropriate home exercise program. Patient does not have any significant PMH influencing Rx and reports motivation to return to FUNCTIONAL ACTIVITY and RETURN TO SPORT.   Patient demonstrates to be a good candidate for physical therapy with good rehab potential and verbalized a good understanding of HIS diagnosis, prognosis and treatment.  Goals have been established and reviewed with the patient.      PT Assessment Results: Decreased strength, Decreased endurance, Orthopedic restrictions  Rehab Prognosis: Good     Complexity:  Low complexity  evaluation  due to a 15 minute duration, a past medical history WITHOUT any personal factors and/or comorbidities that could impact the POC, examination of body systems completed on one to two elements, the patient presents with a stable condition, and clinical decision making using the LEFS was of low complexity.     Prognosis:  Rehab Prognosis: Good    Problem List  Activity Limitations, Decreased Functional Level, Decreased knowledge of HEP, Strength, Endurance, and Return to sport    Impairments   IMPAIRED STRENGTH LOWER BODY, IMPAIRED CORE STABILITY, IMPAIRED FUNCTIONAL ACTIVITY LEVEL, and IMPAIRED FUNCTIONAL MOVEMENT PATTERNS    Functional Limitations:  LIMITATIONS PERFORMING BASIC ADL'S, SPORT ISSUES, PARTICIPATION IN HOBBIES, and PARTICIPATION IN LEISURE ACTIVITIES    General Visit Information:  Reason for Referral: PT Evaluation  Referred By: Dr. Russ Yoon  General Comment: Z98.890 (ICD-10-CM) - S/P osteotomy of left tibial tuberosity; ARTHROSCOPY DIAGNOSTIC KNEE WITH OPEN TIBIAL TUBERCLE OSTEOTOMY 10/30/2024 Dr. Yoon; patellofemoral disorder M22.2    Pre-Cautions:  STEADI Fall Risk Score (The score of 4 or more indicates an increased risk of falling): 0     Medical Precautions:  (ADD))     Reason for Visit:  PT Evaluate and Treat    Initial Evaluation:  Referred By: Dr. Russ Yoon    Insurance  Insurance reviewed  Name of Insurance:  Dr. Russ Yoon  Visit No.  1  * (EVAL) S/P OSTEOTOMY LEFT TIBIAL TUBEROSITY Z98.890; ANTHEM: 20% COINSUR // 3000/6000 DED // 6000/12,000 OOP // $35 COPAY // AUTH 1V PHYSICAL THERAPY 3/12/25 - 3/26/25 AUTH#: [id number]// MUST PRIOR AUTH VIA GetMyRx AFTER EVAL // [id number]     Subjective:    Current Episode  Date of Onset:  surgery 10/30/2024  Mechanism of injury:  Remote injury that happened a while ago.  Chief Complaint:  L knee weakness, wants to go to playing sports/softball (catcher, outfield)  Progression of symptoms:  improving  Previous treatments:  none    Pain  Score:  Pain Assessment: 0-10  Pain Assessment  Pain Assessment: 0-10  0-10 (Numeric) Pain Score: 0 - No pain  Pain Type: Chronic pain  Pain Location: Knee  Pain Orientation: Left  Pain Frequency: Intermittent  Pain Onset: Ongoing  Pain Type: Chronic pain  Pain Location: Knee  Pain Orientation: Left     Pain Frequency: Intermittent    Better with:  ice    Worse with:  weakness, decreased endurance L knee, wants to get back to playing sports (softball)    Medical History/Surgical History:  Medical Precautions:  (ADD)    Reviewed medical history form with patient (medications/allergies reviewed with patient).  Current Outpatient Medications   Medication Instructions   • cyclobenzaprine (FLEXERIL) 10 mg, oral, 3 times daily PRN   • methylphenidate ER (CONCERTA) 36 mg, oral, Every morning, Do not crush, chew, or split.     Radiology:    Exam Information    Status Exam Begun Exam Ended   Final 2/14/2025 10:02 2/14/2025 10:05     Study Result    Narrative & Impression   Interpreted By:  Russ Atwood,   STUDY:  XR KNEE LEFT 1-2 VIEWS;  ;  2/14/2025 10:05 am      INDICATION:  Signs/Symptoms:pain.      ACCESSION NUMBER(S):  [id number]      ORDERING CLINICIAN:  RUSS ATWOOD      FINDINGS:  No acute fracture or dislocations of the knee. Intact hardware from  tibial tubercle osteotomy. No arthritic change and well-preserved  joint space. Concentric patellofemoral joint. No effusion or soft  tissue swelling.     Functional Assessment:  Level of [place]:  Level of [place]: Independent with ADLs and functional transfers    Social History:    Occupation:  D[employer]  Work Status:  EMPLOYED  Patient Awareness:  Patient is aware of HIS diagnosis and prognosis.  Social Support/History:  LIVES WITH PARENTS    Objective:    Weightbearing Status:  FWB L LE    Skin:  surgical incision L knee healed and closed, no active signs of infection.    Palpation:   no point tenderness over L knee    Sensation:  Patient denies  numbness/tingling of bilateral LOWER extremities.    Gait:  Normal gait    Lower Extremity Movement Testing:  Squat- WNL's  Lunge- WNL's R and L  Single leg stance  R- WNL's  L- WNL's  Unilateral Heel Raise  R- WNL's  L- WNL's    ROM:  AROM  R knee AROM WNL's, L knee AROM WNL's, R hip AROM WNL's, L hip AROM WNL's, R ankle/foot AROM WNL's, and L ankle/foot AROM WNL's    Strength:    L knee flexion 4/5  L knee extension 4/5  R knee 5/5 all planes, R hip 5/5 all planes, L hip 5/5 all planes, R ankle/foot 5/5 all planes, and L ankle/foot 5/5 all planes    Outcome measure  Lower Extremity Funtional Score (LEFS): 74/80     Treatment  Time in clinic started at  2:45pm  Time in clinic ended at  3:23pm  Total time in clinic is . 38 minutes  Total timed code time is  38 minutes    Treatment Performed Today:.   PT Initial Evaluation and Self-Care/Home Management HEP, Therapeutic Exercise  Individual(s) Educated: Patient  Education Provided: Home Exercise Program  Diagnosis and Precautions: Z98.890 (ICD-10-CM) - S/P osteotomy of left tibial tuberosity; ARTHROSCOPY DIAGNOSTIC KNEE WITH OPEN TIBIAL TUBERCLE OSTEOTOMY 10/30/2024 Dr. Yoon; patellofemoral disorder M22.2  Risk and Benefits Discussed with Patient/Caregiver/Other: yes  Patient/Caregiver Demonstrated Understanding: yes  Plan of Care Discussed and Agreed Upon: yes  Patient Response to Education: Patient/Caregiver Verbalized Understanding of Information, Patient/Caregiver Performed Return Demonstration of Exercises/Activities, Patient/Caregiver Asked Appropriate Questions    Patient instructed in a home exercise program, has been given handouts for each of the exercises performed and was given another sheet instructing patient in the amount of reps to perform and the shmuel to follow while doing the exercises     Access Code: [email or web address]  URL: [email or web address]  Date: 03/12/2025  Prepared by: Caleb James    Exercises  - Mini Squat  - 1 x daily -  4-5 x weekly - 2 sets - 10 reps - 5-10 hold  - Mini Lunge  - 1 x daily - 4-5 x weekly - 2 sets - 10 reps - 5-10 hold  - Lateral Lunge  - 1 x daily - 4-5 x weekly - 2 sets - 10 reps - 5-10 hold  - Step Up  - 1 x daily - 4-5 x weekly - 2 sets - 10 reps - 5-10 hold  - Lateral Step Up  - 1 x daily - 4-5 x weekly - 2 sets - 10 reps - 5-10 hold  - Single Leg Stance  - 1 x daily - 4-5 x weekly - 2 sets - 10 reps - 5-10 hold  - Lateral Step Down  - 1 x daily - 3-4 x weekly - 2 sets - 10 reps - 5-10 hold  - Single Leg Deadlift with Kettlebell  - 1 x daily - 3-4 x weekly - 2 sets - 10 reps - 5-10 hold  - Single Leg Balance with Clock Reach  - 1 x daily - 3-4 x weekly - 2 sets - 10 reps - 5-10 hold         Current Participants as of 3/12/2025    Name Type Comments Contact Info    Russ Yoon MD Referring Provider  [phone]    Signature pending    Caleb James PT Physical Therapist  [phone]    Signature pending

## 2025-03-12 NOTE — PROGRESS NOTES
PT Initial Evaluation    Patient Name:  Mal Coleman    MRN:  [id number]    :  2005    Today's Date:  25    Time Calculation  Start Time: 1445  Stop Time: 1523  Time Calculation (min): 38 min  PT Evaluation Time Entry  PT Evaluation (Low) Time Entry: 15  PT Therapeutic Procedures Time Entry  Therapeutic Exercise Time Entry: 8  Self-Care/Home Mgmt Training: 15       Informed Consent  Patient has been informed of all evaluation findings and treatment plans and agrees to participate in Physical Therapy services and plans as outlined.    Diagnosis:  Diagnosis and Precautions: Z98.890 (ICD-10-CM) - S/P osteotomy of left tibial tuberosity; ARTHROSCOPY DIAGNOSTIC KNEE WITH OPEN TIBIAL TUBERCLE OSTEOTOMY 10/30/2024 Dr. Yoon; patellofemoral disorder M22.2    Goals:   1. Strength and Endurance  Goal: Within 6 weeks, the patient’s quadriceps and hamstring strength will improve from 4/5 to 5/5 on the Manual Muscle Test (MMT), allowing performance of 10 controlled squats with proper form and pain <=3/10.  2. Neuromuscular Control, Proprioception, and Coordination  Goal: Within 4 weeks, the patient will demonstrate improved knee stability by maintaining single-leg balance on the affected side for 10 seconds without compensatory movements or excessive wobble.  3. Sport-Specific Goals  Goal: Within 7-8 weeks, the patient will return to sport-specific drills (e.g., running, agility exercises) with proper technique and <=2/10 pain.  4. Education, Self-Management, and Compliance  Goal: By the second therapy visit, the patient will accurately demonstrate their prescribed knee home exercise program (HEP), understanding proper form, frequency, and how to modify exercises if pain or fatigue increases.  5. Long-Term Maintenance and Prevention  Goal: By discharge (approximately 8 weeks), the patient will establish a regular knee exercise routine, maintain proper mechanics during daily tasks and sports, and experience no  significant pain flare-ups (>3/10) during usual activities.      Plan of Care:      Treatment/Interventions: Cryotherapy, Education/ Instruction, Gait training, Manual therapy, Neuromuscular re-education, Self care/ home management, Therapeutic activities, Therapeutic exercises, Vasopneumatic device  PT Plan: Skilled PT  PT Frequency: 1 time per week  Duration: 7 visits     Certification Period Start Date: 03/12/25  Certification Period End Date: 06/10/25  Number of Treatments Authorized: 7  Rehab Potential: Good  Plan of Care Agreement: Patient    PT Assessment:    Patient is a 20 y.o. MALE with c/o L knee pain.   Patient is alert and oriented x 3.  Patient presents with medical diagnosis of Z98.890 (ICD-10-CM) - S/P osteotomy of left tibial tuberosity; ARTHROSCOPY DIAGNOSTIC KNEE WITH OPEN TIBIAL TUBERCLE OSTEOTOMY 10/30/2024 Dr. Yoon; patellofemoral disorder M22.2  contributing to compensatory soft tissue dysfunction, pain, stiffness and weakness of the L knee.   Significant past medical history/past surgical history includes ADD.    Skilled care is needed to progress the patient back to these activities without exacerbating symptoms.   Patient requires skilled PT services to address the problems identified and the individualized patient's goals as outlined in the problems and goals section of this evaluation.  A skilled PT is required to address these key impairments and to provide and progress with an appropriate home exercise program. Patient does not have any significant PMH influencing Rx and reports motivation to return to FUNCTIONAL ACTIVITY and RETURN TO SPORT.   Patient demonstrates to be a good candidate for physical therapy with good rehab potential and verbalized a good understanding of HIS diagnosis, prognosis and treatment.  Goals have been established and reviewed with the patient.      PT Assessment Results: Decreased strength, Decreased endurance, Orthopedic restrictions  Rehab Prognosis: Good      Complexity:  Low complexity evaluation  due to a 15 minute duration, a past medical history WITHOUT any personal factors and/or comorbidities that could impact the POC, examination of body systems completed on one to two elements, the patient presents with a stable condition, and clinical decision making using the LEFS was of low complexity.     Prognosis:  Rehab Prognosis: Good    Problem List  Activity Limitations, Decreased Functional Level, Decreased knowledge of HEP, Strength, Endurance, and Return to sport    Impairments   IMPAIRED STRENGTH LOWER BODY, IMPAIRED CORE STABILITY, IMPAIRED FUNCTIONAL ACTIVITY LEVEL, and IMPAIRED FUNCTIONAL MOVEMENT PATTERNS    Functional Limitations:  LIMITATIONS PERFORMING BASIC ADL'S, SPORT ISSUES, PARTICIPATION IN HOBBIES, and PARTICIPATION IN LEISURE ACTIVITIES    General Visit Information:  Reason for Referral: PT Evaluation  Referred By: Dr. Russ Yoon  General Comment: Z98.890 (ICD-10-CM) - S/P osteotomy of left tibial tuberosity; ARTHROSCOPY DIAGNOSTIC KNEE WITH OPEN TIBIAL TUBERCLE OSTEOTOMY 10/30/2024 Dr. Yoon; patellofemoral disorder M22.2    Pre-Cautions:  STEADI Fall Risk Score (The score of 4 or more indicates an increased risk of falling): 0     Medical Precautions:  (ADD))     Reason for Visit:  PT Evaluate and Treat    Initial Evaluation:  Referred By: Dr. Russ Yoon    Insurance  Insurance reviewed  Name of Insurance:  Dr. Russ Yoon  Visit No.  1  * (EVAL) S/P OSTEOTOMY LEFT TIBIAL TUBEROSITY Z98.890; ANTHEM: 20% COINSUR // 3000/6000 DED // 6000/12,000 OOP // $35 COPAY // AUTH 1V PHYSICAL THERAPY 3/12/25 - 3/26/25 AUTH#: [id number]// MUST PRIOR AUTH VIA [employer]tras AFTER EVAL // [id number]     Subjective:    Current Episode  Date of Onset:  surgery 10/30/2024  Mechanism of injury:  Remote injury that happened a while ago.  Chief Complaint:  L knee weakness, wants to go to playing sports/softball (catcher, outfield)  Progression of symptoms:   improving  Previous treatments:  none    Pain Score:  Pain Assessment: 0-10  Pain Assessment  Pain Assessment: 0-10  0-10 (Numeric) Pain Score: 0 - No pain  Pain Type: Chronic pain  Pain Location: Knee  Pain Orientation: Left  Pain Frequency: Intermittent  Pain Onset: Ongoing  Pain Type: Chronic pain  Pain Location: Knee  Pain Orientation: Left     Pain Frequency: Intermittent    Better with:  ice    Worse with:  weakness, decreased endurance L knee, wants to get back to playing sports (softball)    Medical History/Surgical History:  Medical Precautions:  (ADD)    Reviewed medical history form with patient (medications/allergies reviewed with patient).  Current Outpatient Medications   Medication Instructions    cyclobenzaprine (FLEXERIL) 10 mg, oral, 3 times daily PRN    methylphenidate ER (CONCERTA) 36 mg, oral, Every morning, Do not crush, chew, or split.     Radiology:    Exam Information    Status Exam Begun Exam Ended   Final 2/14/2025 10:02 2/14/2025 10:05     Study Result    Narrative & Impression   Interpreted By:  Russ Atwood,   STUDY:  XR KNEE LEFT 1-2 VIEWS;  ;  2/14/2025 10:05 am      INDICATION:  Signs/Symptoms:pain.      ACCESSION NUMBER(S):  [id number]      ORDERING CLINICIAN:  RUSS ATWOOD      FINDINGS:  No acute fracture or dislocations of the knee. Intact hardware from  tibial tubercle osteotomy. No arthritic change and well-preserved  joint space. Concentric patellofemoral joint. No effusion or soft  tissue swelling.     Functional Assessment:  Level of [place]:  Level of [place]: Independent with ADLs and functional transfers    Social History:    Occupation:  D[employer]  Work Status:  EMPLOYED  Patient Awareness:  Patient is aware of HIS diagnosis and prognosis.  Social Support/History:  LIVES WITH PARENTS    Objective:    Weightbearing Status:  FWB L LE    Skin:  surgical incision L knee healed and closed, no active signs of infection.    Palpation:   no point tenderness over L  knee    Sensation:  Patient denies numbness/tingling of bilateral LOWER extremities.    Gait:  Normal gait    Lower Extremity Movement Testing:  Squat- WNL's  Lunge- WNL's R and L  Single leg stance  R- WNL's  L- WNL's  Unilateral Heel Raise  R- WNL's  L- WNL's    ROM:  AROM  R knee AROM WNL's, L knee AROM WNL's, R hip AROM WNL's, L hip AROM WNL's, R ankle/foot AROM WNL's, and L ankle/foot AROM WNL's    Strength:    L knee flexion 4/5  L knee extension 4/5  R knee 5/5 all planes, R hip 5/5 all planes, L hip 5/5 all planes, R ankle/foot 5/5 all planes, and L ankle/foot 5/5 all planes    Outcome measure  Lower Extremity Funtional Score (LEFS): 74/80     Treatment  Time in clinic started at  2:45pm  Time in clinic ended at  3:23pm  Total time in clinic is . 38 minutes  Total timed code time is  38 minutes    Treatment Performed Today:.   PT Initial Evaluation and Self-Care/Home Management HEP, Therapeutic Exercise  Individual(s) Educated: Patient  Education Provided: Home Exercise Program  Diagnosis and Precautions: Z98.890 (ICD-10-CM) - S/P osteotomy of left tibial tuberosity; ARTHROSCOPY DIAGNOSTIC KNEE WITH OPEN TIBIAL TUBERCLE OSTEOTOMY 10/30/2024 Dr. Yoon; patellofemoral disorder M22.2  Risk and Benefits Discussed with Patient/Caregiver/Other: yes  Patient/Caregiver Demonstrated Understanding: yes  Plan of Care Discussed and Agreed Upon: yes  Patient Response to Education: Patient/Caregiver Verbalized Understanding of Information, Patient/Caregiver Performed Return Demonstration of Exercises/Activities, Patient/Caregiver Asked Appropriate Questions    Patient instructed in a home exercise program, has been given handouts for each of the exercises performed and was given another sheet instructing patient in the amount of reps to perform and the shmuel to follow while doing the exercises     Access Code: [email or web address]  URL: [email or web address]  Date: 03/12/2025  Prepared by: Caleb  Dyriw    Exercises  - Mini Squat  - 1 x daily - 4-5 x weekly - 2 sets - 10 reps - 5-10 hold  - Mini Lunge  - 1 x daily - 4-5 x weekly - 2 sets - 10 reps - 5-10 hold  - Lateral Lunge  - 1 x daily - 4-5 x weekly - 2 sets - 10 reps - 5-10 hold  - Step Up  - 1 x daily - 4-5 x weekly - 2 sets - 10 reps - 5-10 hold  - Lateral Step Up  - 1 x daily - 4-5 x weekly - 2 sets - 10 reps - 5-10 hold  - Single Leg Stance  - 1 x daily - 4-5 x weekly - 2 sets - 10 reps - 5-10 hold  - Lateral Step Down  - 1 x daily - 3-4 x weekly - 2 sets - 10 reps - 5-10 hold  - Single Leg Deadlift with Kettlebell  - 1 x daily - 3-4 x weekly - 2 sets - 10 reps - 5-10 hold  - Single Leg Balance with Clock Reach  - 1 x daily - 3-4 x weekly - 2 sets - 10 reps - 5-10 hold

## 2025-03-18 ENCOUNTER — TREATMENT (OUTPATIENT)
Dept: PHYSICAL THERAPY | Facility: CLINIC | Age: 20
End: 2025-03-18
Payer: COMMERCIAL

## 2025-03-18 DIAGNOSIS — Z98.890 S/P OSTEOTOMY OF LEFT TIBIAL TUBEROSITY: Primary | ICD-10-CM

## 2025-03-18 DIAGNOSIS — M22.2X2 PATELLOFEMORAL DISORDERS, LEFT KNEE: ICD-10-CM

## 2025-03-18 PROCEDURE — 97110 THERAPEUTIC EXERCISES: CPT | Mod: GP

## 2025-03-18 PROCEDURE — 97112 NEUROMUSCULAR REEDUCATION: CPT | Mod: GP

## 2025-03-18 NOTE — PROGRESS NOTES
Physical Therapy     Physical Therapy Treatment     Patient Name: Mal Coleman  MRN: [id number]  Today's Date: 3/18/2025  Time Calculation  Start Time: 1432  Stop Time: 1515  Time Calculation (min): 43 min           Insurance:  Visit: #2    Payor: [employer] / Plan: [employer] / Product Type: *No Product type* /      Subjective:  No increased pain. Asking to complete HEP each day and wants to return to softball, eventually getting to baseball.     Current pain: 0 /10; Range: 0-3/10     Objective:  Cues given for posture and increased balance.      Treatment:  Recumbent bike 5 min on level 5  Step up with contralateral hip flex: 2x12   Step up with contralateral hip flex adding yellow TB to lateral resist : 2x12  Mini squat on bosu ball with blue side down: 2x12  Plyo hop down with jump: 2x10  Resisted backward/fwd walking with cues for increased hip flex: 2x12  Plyo jump with 180 deg turn: 2x5 each direction      OP Education: HEP: #Access Code: [email or web address]  URL: [email or web address]  Date: 03/12/2025  Prepared by: Caleb James     Exercises  - Mini Squat  - 1 x daily - 4-5 x weekly - 2 sets - 10 reps - 5-10 hold  - Mini Lunge  - 1 x daily - 4-5 x weekly - 2 sets - 10 reps - 5-10 hold  - Lateral Lunge  - 1 x daily - 4-5 x weekly - 2 sets - 10 reps - 5-10 hold  - Step Up  - 1 x daily - 4-5 x weekly - 2 sets - 10 reps - 5-10 hold  - Lateral Step Up  - 1 x daily - 4-5 x weekly - 2 sets - 10 reps - 5-10 hold  - Single Leg Stance  - 1 x daily - 4-5 x weekly - 2 sets - 10 reps - 5-10 hold  - Lateral Step Down  - 1 x daily - 3-4 x weekly - 2 sets - 10 reps - 5-10 hold  - Single Leg Deadlift with Kettlebell  - 1 x daily - 3-4 x weekly - 2 sets - 10 reps - 5-10 hold  - Single Leg Balance with Clock Reach  - 1 x daily - 3-4 x weekly - 2 sets - 10 reps - 5-10 hold     Diagnosis:  1. S/P osteotomy of left tibial tuberosity    2. Patellofemoral disorders, left knee      Problem List Items Addressed This Visit              ICD-10-CM       Musculoskeletal and Injuries    S/P osteotomy of left tibial tuberosity - Primary Z98.890    Patellofemoral disorders, left knee M22.2X2     Goals:   1. Strength and Endurance  Goal: Within 6 weeks, the patient’s quadriceps and hamstring strength will improve from 4/5 to 5/5 on the Manual Muscle Test (MMT), allowing performance of 10 controlled squats with proper form and pain <=3/10.  2. Neuromuscular Control, Proprioception, and Coordination  Goal: Within 4 weeks, the patient will demonstrate improved knee stability by maintaining single-leg balance on the affected side for 10 seconds without compensatory movements or excessive wobble.  3. Sport-Specific Goals  Goal: Within 7-8 weeks, the patient will return to sport-specific drills (e.g., running, agility exercises) with proper technique and <=2/10 pain.  4. Education, Self-Management, and Compliance  Goal: By the second therapy visit, the patient will accurately demonstrate their prescribed knee home exercise program (HEP), understanding proper form, frequency, and how to modify exercises if pain or fatigue increases.  5. Long-Term Maintenance and Prevention  Goal: By discharge (approximately 8 weeks), the patient will establish a regular knee exercise routine, maintain proper mechanics during daily tasks and sports, and experience no significant pain flare-ups (>3/10) during usual activities.     Assessment:  Pt demonstrates minor fatigue with high impact, but has no c/o increased pain. Pt demos good stability, but would benefit from continued skilled therapy to improve balance and endurance before returning to sport.     Plan:  Discussed with pt possibility to decrease visits to 1x every two weeks while continuing with HEP.     Cl Mcnally, PT   3/18/2025

## 2025-03-25 ENCOUNTER — TREATMENT (OUTPATIENT)
Dept: PHYSICAL THERAPY | Facility: CLINIC | Age: 20
End: 2025-03-25
Payer: COMMERCIAL

## 2025-03-25 DIAGNOSIS — M22.2X2 PATELLOFEMORAL DISORDERS, LEFT KNEE: ICD-10-CM

## 2025-03-25 PROCEDURE — 97110 THERAPEUTIC EXERCISES: CPT | Mod: GP,CQ

## 2025-03-25 ASSESSMENT — PAIN SCALES - GENERAL: PAINLEVEL_OUTOF10: 0 - NO PAIN

## 2025-03-25 ASSESSMENT — PAIN - FUNCTIONAL ASSESSMENT: PAIN_FUNCTIONAL_ASSESSMENT: 0-10

## 2025-03-25 NOTE — PROGRESS NOTES
Physical Therapy    Physical Therapy Treatment    Patient Name: Mal Coleman  MRN: [id number]  Today's Date: 3/25/2025    Time Entry:   Time Calculation  Start Time: 0240  Stop Time: 0320  Time Calculation (min): 40 min                     Assessment:  Progressed plyometric exercises today. Pt had no complaints of increased pain during or after exercises. Minimal muscle fatigue only per patient. He would benefit from PT to continue to address impairments in order to improve strength, flexibility, balance, and body mechanics and to decrease symptoms and increase overall function.   PT Assessment  PT Assessment Results: Decreased strength, Decreased endurance, Orthopedic restrictions  Plan:  Continue to progress strengthening as estuardo  OP PT Plan  PT Plan: Skilled PT    Current Problem  1. Patellofemoral disorders, left knee  Follow Up In Physical Therapy          General     General  Referred By: Dr. Russ Yoon  General Comment: Z98.890 (ICD-10-CM) - S/P osteotomy of left tibial tuberosity; ARTHROSCOPY DIAGNOSTIC KNEE WITH OPEN TIBIAL TUBERCLE OSTEOTOMY 10/30/2024 Dr. Yoon; patellofemoral disorder M22.2    Subjective    Pt had no complaints of left knee pain entering clinic. He states that he had no issues after last session. Muscle fatigue only but no pain.   Precautions  Precautions  STEADI Fall Risk Score (The score of 4 or more indicates an increased risk of falling): 0  Medical Precautions:  (ADD)       Pain  Pain Assessment  Pain Assessment: 0-10  0-10 (Numeric) Pain Score: 0 - No pain  Pain Type: Chronic pain  Pain Location: Knee  Pain Orientation: Left    Objective       Verbal cues for gluteal contraction to prevent medial femoral rotation on left           Treatments:     Upright bike Level 5 x 6 min  Hamstring stretch on step 30 sec x 3   Calf stretch on step  30 sec x 3   Lunge on BOSU fwd/lat x 10 ea   Mini squats bosu ball x 10   Bounds x 5   BOSU balance/ squat with ball toss   Line hops fwd/back, side  to side x 10 ea   Resisted cord walking 4 way with march heavy resistance x 4 ea way   Ladder drills lateral/fwd double leg, single leg, bounds   Plyo jump with 180 deg turn x 5 ea way   SLS ball toss at tramp x 3 way   6 inch step up with alt hip flexion x 10   OP EDUCATION:       Goals:  1. Strength and Endurance  Goal: Within 6 weeks, the patient’s quadriceps and hamstring strength will improve from 4/5 to 5/5 on the Manual Muscle Test (MMT), allowing performance of 10 controlled squats with proper form and pain <=3/10.  2. Neuromuscular Control, Proprioception, and Coordination  Goal: Within 4 weeks, the patient will demonstrate improved knee stability by maintaining single-leg balance on the affected side for 10 seconds without compensatory movements or excessive wobble.  3. Sport-Specific Goals  Goal: Within 7-8 weeks, the patient will return to sport-specific drills (e.g., running, agility exercises) with proper technique and <=2/10 pain.  4. Education, Self-Management, and Compliance  Goal: By the second therapy visit, the patient will accurately demonstrate their prescribed knee home exercise program (HEP), understanding proper form, frequency, and how to modify exercises if pain or fatigue increases.  5. Long-Term Maintenance and Prevention  Goal: By discharge (approximately 8 weeks), the patient will establish a regular knee exercise routine, maintain proper mechanics during daily tasks and sports, and experience no significant pain flare-ups (>3/10) during usual activities.

## 2025-04-14 NOTE — PROGRESS NOTES
History of Present Illness:  Date of Surgery: 10/30/24.  Status post left knee scope with open tibial tubercle osteotomy. His knee is doing well. He does have some soreness on occasion.     Imaging:  X-ray left knee: Shows a well healing TTO    Exam:  Mild effusion  Left knee incisions well healed  Flexion to 130. Full passive motion   Left knee range of motion intentionally not tested today  No calf swelling   Negative Vivien's test  Distal neurovascular exam intact    Assessment:  S/p left knee TTO    Plan:  We will get him in to a functional brace for stability and returning to normal activities.   Follow-up in 3 months with repeat x-rays    Past Medical History:   Diagnosis Date    ADHD (attention deficit hyperactivity disorder)     Left knee pain 2023    Sinusitis 2023       Medication Documentation Review Audit       Reviewed by Claire Raza MA (Medical Assistant) on 25 at 0900      Medication Order Taking? Sig Documenting Provider Last Dose Status   cyclobenzaprine (Flexeril) 10 mg tablet [id number]  Take 1 tablet (10 mg) by mouth 3 times a day as needed for muscle spasms for up to 4 days. Destiny Garcia PA-C   24 2359   methylphenidate ER (Concerta) 36 mg extended release tablet [id number]  Take 1 tablet (36 mg) by mouth once daily in the morning. Do not crush, chew, or split. Ana Gonzales MD   10/16/24 2359                    No Known Allergies    Social History     Socioeconomic History    Marital status: Single     Spouse name: Not on file    Number of children: Not on file    Years of education: Not on file    Highest education level: Not on file   Occupational History    Not on file   Tobacco Use    Smoking status: Never     Passive exposure: Never    Smokeless tobacco: Never   Vaping Use    Vaping status: Never Used   Substance and Sexual Activity    Alcohol use: Yes     Comment: socially    Drug use: Never    Sexual activity: Defer   Other Topics Concern     Not on file   Social History Narrative    Not on file     Social Drivers of Health     Financial Resource Strain: Low Risk  (10/30/2024)    Overall Financial Resource Strain (CARDIA)     Difficulty of Paying Living Expenses: Not hard at all   Food Insecurity: No Food Insecurity (10/30/2024)    Hunger Vital Sign     Worried About Running Out of Food in the Last Year: Never true     Ran Out of Food in the Last Year: Never true   Transportation Needs: No Transportation Needs (10/30/2024)    PRAPARE - Transportation     Lack of Transportation (Medical): No     Lack of Transportation (Non-Medical): No   Physical Activity: Not on file   Stress: Not on file   Social Connections: Not on file   Intimate Partner Violence: Not At Risk (10/30/2024)    Humiliation, Afraid, Rape, and Kick questionnaire     Fear of Current or Ex-Partner: No     Emotionally Abused: No     Physically Abused: No     Sexually Abused: No   Housing Stability: Low Risk  (10/30/2024)    Housing Stability Vital Sign     Unable to Pay for Housing in the Last Year: No     Number of Times Moved in the Last Year: 1     Homeless in the Last Year: No       No past surgical history on file.    Scribe Attestation  By signing my name below, INaya Scribe   attest that this documentation has been prepared under the direction and in the presence of Russ Yoon MD.

## 2025-04-15 ENCOUNTER — APPOINTMENT (OUTPATIENT)
Dept: PHYSICAL THERAPY | Facility: CLINIC | Age: 20
End: 2025-04-15
Payer: COMMERCIAL

## 2025-04-15 ENCOUNTER — HOSPITAL ENCOUNTER (OUTPATIENT)
Dept: RADIOLOGY | Facility: CLINIC | Age: 20
Discharge: HOME | End: 2025-04-15
Payer: COMMERCIAL

## 2025-04-15 ENCOUNTER — OFFICE VISIT (OUTPATIENT)
Dept: ORTHOPEDIC SURGERY | Facility: CLINIC | Age: 20
End: 2025-04-15
Payer: COMMERCIAL

## 2025-04-15 DIAGNOSIS — Z98.890 S/P OSTEOTOMY OF LEFT TIBIAL TUBEROSITY: Primary | ICD-10-CM

## 2025-04-15 DIAGNOSIS — Z98.890 S/P OSTEOTOMY OF LEFT TIBIAL TUBEROSITY: ICD-10-CM

## 2025-04-15 PROCEDURE — 99213 OFFICE O/P EST LOW 20 MIN: CPT | Performed by: ORTHOPAEDIC SURGERY

## 2025-04-15 PROCEDURE — 73560 X-RAY EXAM OF KNEE 1 OR 2: CPT | Mod: LT

## 2025-04-17 ENCOUNTER — DOCUMENTATION (OUTPATIENT)
Dept: ORTHOPEDIC SURGERY | Facility: CLINIC | Age: 20
End: 2025-04-17
Payer: COMMERCIAL

## 2025-04-22 ENCOUNTER — APPOINTMENT (OUTPATIENT)
Dept: PHYSICAL THERAPY | Facility: CLINIC | Age: 20
End: 2025-04-22
Payer: COMMERCIAL

## 2025-04-29 ENCOUNTER — DOCUMENTATION (OUTPATIENT)
Dept: PHYSICAL THERAPY | Facility: CLINIC | Age: 20
End: 2025-04-29
Payer: COMMERCIAL

## 2025-04-29 ENCOUNTER — APPOINTMENT (OUTPATIENT)
Dept: PHYSICAL THERAPY | Facility: CLINIC | Age: 20
End: 2025-04-29
Payer: COMMERCIAL

## 2025-04-29 DIAGNOSIS — M22.2X2 PATELLOFEMORAL DISORDERS, LEFT KNEE: ICD-10-CM

## 2025-04-29 NOTE — PROGRESS NOTES
"Physical Therapy    Discharge Summary    Name: Mal Coleman \"Raza\"  MRN: [id number]  : 2005  Date: 25    Discharge Summary: PT    Discharge Information: Date of discharge 2025, Date of last visit 3/25/25, Date of evaluation 3/12/25, Number of attended visits 3, Referred by Dr. Russ Yoon, and Referred for L knee patellofemoral disorder    Rehab Discharge Reason: Attendance inconsistent Patient's last visit was on 3/25/2025.  Due to patient non-compliance with attendance to PT (canceled last 3 visits, 2 no-shows), patient to be discharged from PT at this time.  "

## 2025-05-22 ENCOUNTER — TELEPHONE (OUTPATIENT)
Dept: ORTHOPEDIC SURGERY | Facility: CLINIC | Age: 20
End: 2025-05-22
Payer: COMMERCIAL

## 2025-05-22 NOTE — TELEPHONE ENCOUNTER
5/22/25 - per Angie bowser/neftaly - pt would like to hold off on the ACL brace for now.   Angie will cancel order and can reopen it when the patient decides he wishes to move forward with obtaining the brace

## 2025-07-14 NOTE — PROGRESS NOTES
History of Present Illness:  Date of Surgery: 10/30/24.  Status post left knee scope with open tibial tubercle osteotomy. He is doing well with no complaints. He is hoping to return to baseball when goes back to school. He has been playing slow pitch softball in the meantime.     Imaging:  X-ray left knee: Shows a well healed TTO    Assessment:  S/p left knee TTO    Plan:  Return to normal activities  Follow-up as needed    Exam:  Mild effusion  Left knee incisions well healed  Flexion to 130. Full passive motion   No calf swelling   Negative Vivien's test  Distal neurovascular exam intact    Review of Systems:  GENERAL: Negative for malaise, significant weight loss, fever  MUSCULOSKELETAL: See HPI  NEURO:  Negative      Past Medical History:   Diagnosis Date    ADHD (attention deficit hyperactivity disorder)     Left knee pain 2023    Sinusitis 2023       Medication Documentation Review Audit       Reviewed by Claire Raza MA (Medical Assistant) on 25 at 0900      Medication Order Taking? Sig Documenting Provider Last Dose Status   cyclobenzaprine (Flexeril) 10 mg tablet [id number]  Take 1 tablet (10 mg) by mouth 3 times a day as needed for muscle spasms for up to 4 days. Destiny Garcia PA-C   24 2359   methylphenidate ER (Concerta) 36 mg extended release tablet [id number]  Take 1 tablet (36 mg) by mouth once daily in the morning. Do not crush, chew, or split. Ana Gonzales MD   10/16/24 2359                    No Known Allergies    Social History     Socioeconomic History    Marital status: Single     Spouse name: Not on file    Number of children: Not on file    Years of education: Not on file    Highest education level: Not on file   Occupational History    Not on file   Tobacco Use    Smoking status: Never     Passive exposure: Never    Smokeless tobacco: Never   Vaping Use    Vaping status: Never Used   Substance and Sexual Activity    Alcohol use: Yes     Comment:  socially    Drug use: Never    Sexual activity: Defer   Other Topics Concern    Not on file   Social History Narrative    Not on file     Social Drivers of Health     Financial Resource Strain: Low Risk  (10/30/2024)    Overall Financial Resource Strain (CARDIA)     Difficulty of Paying Living Expenses: Not hard at all   Food Insecurity: No Food Insecurity (10/30/2024)    Hunger Vital Sign     Worried About Running Out of Food in the Last Year: Never true     Ran Out of Food in the Last Year: Never true   Transportation Needs: No Transportation Needs (10/30/2024)    PRAPARE - Transportation     Lack of Transportation (Medical): No     Lack of Transportation (Non-Medical): No   Physical Activity: Not on file   Stress: Not on file   Social Connections: Not on file   Intimate Partner Violence: Not At Risk (10/30/2024)    Humiliation, Afraid, Rape, and Kick questionnaire     Fear of Current or Ex-Partner: No     Emotionally Abused: No     Physically Abused: No     Sexually Abused: No   Housing Stability: Low Risk  (10/30/2024)    Housing Stability Vital Sign     Unable to Pay for Housing in the Last Year: No     Number of Times Moved in the Last Year: 1     Homeless in the Last Year: No       No past surgical history on file.    Scribe Attestation:  By signing my name below, I, Funmi Benitez attest that this documentation has been prepared under the direction and in the presence of Russ Yoon MD.    Provider Attestation - Scribe documentation:  All medical record entries made by Naya Valencia were at my direction and personally dictated by me, Russ Yoon MD. I have reviewed the chart and agree that the record is accurate and I confirmed that it reflects my personal performance of the history, physical exam, discussion, and plan.

## 2025-07-15 ENCOUNTER — HOSPITAL ENCOUNTER (OUTPATIENT)
Dept: RADIOLOGY | Facility: CLINIC | Age: 20
Discharge: HOME | End: 2025-07-15
Payer: COMMERCIAL

## 2025-07-15 ENCOUNTER — OFFICE VISIT (OUTPATIENT)
Dept: ORTHOPEDIC SURGERY | Facility: CLINIC | Age: 20
End: 2025-07-15
Payer: COMMERCIAL

## 2025-07-15 DIAGNOSIS — Z98.890 S/P OSTEOTOMY OF LEFT TIBIAL TUBEROSITY: ICD-10-CM

## 2025-07-15 PROCEDURE — 73562 X-RAY EXAM OF KNEE 3: CPT | Mod: LEFT SIDE | Performed by: ORTHOPAEDIC SURGERY

## 2025-07-15 PROCEDURE — 73562 X-RAY EXAM OF KNEE 3: CPT | Mod: LT

## 2025-07-15 PROCEDURE — 99212 OFFICE O/P EST SF 10 MIN: CPT | Performed by: ORTHOPAEDIC SURGERY

## 2025-07-15 PROCEDURE — 99213 OFFICE O/P EST LOW 20 MIN: CPT | Performed by: ORTHOPAEDIC SURGERY

## 2025-07-24 ENCOUNTER — HOSPITAL ENCOUNTER (OUTPATIENT)
Dept: RADIOLOGY | Facility: CLINIC | Age: 20
Discharge: HOME | End: 2025-07-24
Payer: COMMERCIAL

## 2025-07-24 ENCOUNTER — APPOINTMENT (OUTPATIENT)
Dept: PEDIATRICS | Facility: CLINIC | Age: 20
End: 2025-07-24
Payer: COMMERCIAL

## 2025-07-24 VITALS
BODY MASS INDEX: 27.85 KG/M2 | SYSTOLIC BLOOD PRESSURE: 120 MMHG | WEIGHT: 198.9 LBS | DIASTOLIC BLOOD PRESSURE: 76 MMHG | HEIGHT: 71 IN | HEART RATE: 65 BPM | TEMPERATURE: 98.3 F | RESPIRATION RATE: 18 BRPM

## 2025-07-24 DIAGNOSIS — S59.911A INJURY OF RIGHT FOREARM, INITIAL ENCOUNTER: Primary | ICD-10-CM

## 2025-07-24 DIAGNOSIS — Z00.129 ENCOUNTER FOR ROUTINE CHILD HEALTH EXAMINATION WITHOUT ABNORMAL FINDINGS: ICD-10-CM

## 2025-07-24 DIAGNOSIS — Z23 IMMUNIZATION DUE: ICD-10-CM

## 2025-07-24 DIAGNOSIS — Z00.00 HEALTH CARE MAINTENANCE: ICD-10-CM

## 2025-07-24 DIAGNOSIS — S59.911A INJURY OF RIGHT FOREARM, INITIAL ENCOUNTER: ICD-10-CM

## 2025-07-24 DIAGNOSIS — F90.0 ATTENTION DEFICIT HYPERACTIVITY DISORDER (ADHD), PREDOMINANTLY INATTENTIVE TYPE: ICD-10-CM

## 2025-07-24 PROCEDURE — 73090 X-RAY EXAM OF FOREARM: CPT | Mod: RT

## 2025-07-24 PROCEDURE — 73090 X-RAY EXAM OF FOREARM: CPT | Mod: RIGHT SIDE | Performed by: RADIOLOGY

## 2025-07-24 PROCEDURE — 1036F TOBACCO NON-USER: CPT | Performed by: PEDIATRICS

## 2025-07-24 PROCEDURE — 3008F BODY MASS INDEX DOCD: CPT | Performed by: PEDIATRICS

## 2025-07-24 PROCEDURE — 96127 BRIEF EMOTIONAL/BEHAV ASSMT: CPT | Performed by: PEDIATRICS

## 2025-07-24 PROCEDURE — 99395 PREV VISIT EST AGE 18-39: CPT | Performed by: PEDIATRICS

## 2025-07-24 RX ORDER — METHYLPHENIDATE HYDROCHLORIDE 27 MG/1
27 TABLET ORAL DAILY
Qty: 30 TABLET | Refills: 0 | Status: SHIPPED | OUTPATIENT
Start: 2025-07-24 | End: 2025-08-23

## 2025-07-24 NOTE — PROGRESS NOTES
Subjective   Mal is a 20 y.o. male who presents today with his alone for his Health Maintenance and Supervision Exam.    PHQ-No data recorded    General Health:  Raza is overall in good health.  Concerns today: Yes- decreasing concerta 36mg .  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 Yes    Elimination:  Elimination patterns appropriate: Yes    Sleep:  Sleep patterns appropriate? Yes    Development/Education:  Raza is in college at [employer]    Objective   Physical Exam  Constitutional:       Appearance: Normal appearance.   HENT:      Right Ear: Tympanic membrane normal.      Left Ear: Tympanic membrane normal.      Nose: Nose normal.      Mouth/Throat:      Mouth: Mucous membranes are moist.      Pharynx: Oropharynx is clear.     Eyes:      Extraocular Movements: Extraocular movements intact.      Conjunctiva/sclera: Conjunctivae normal.      Pupils: Pupils are equal, round, and reactive to light.       Cardiovascular:      Heart sounds: Normal heart sounds.   Pulmonary:      Effort: Pulmonary effort is normal.      Breath sounds: Normal breath sounds.   Abdominal:      General: Abdomen is flat.      Palpations: Abdomen is soft.     Musculoskeletal:      Cervical back: Neck supple.      Comments: Right wrist pain, wearing a brace     Neurological:      General: No focal deficit present.      Mental Status: He is alert.     Psychiatric:         Mood and Affect: Mood normal.         Assessment/Plan   Healthy 20 y.o. male child.  1. Anticipatory guidance discussed.  Safety topics reviewed.  2. No orders of the defined types were placed in this encounter.    3. Follow-up visit in 1 year for next well child visit, or sooner as needed.    4. Immunizations per order   5.Depression screen reviewed    Decreased Concerta to 27 mg   Only sent 1 rx   He will call with an update once school starts   Ok to take Melatonin     Ordered xray of right forearm

## 2025-09-22 ENCOUNTER — APPOINTMENT (OUTPATIENT)
Dept: PEDIATRICS | Facility: CLINIC | Age: 20
End: 2025-09-22
Payer: COMMERCIAL

## (undated) DEVICE — PADDING, UNDERCAST, WEBRIL, 6 IN X 4 YD, REG, NS

## (undated) DEVICE — BLADE, STRYKER, 4.0MM, ANGLED, AGGRESSIVE PLUS

## (undated) DEVICE — Device

## (undated) DEVICE — DRESSING, MEPILEX BORDER, POST-OP AG, 4 X 6 IN

## (undated) DEVICE — PROBE, SERFAS, 3.5MM, 50 S, ENERGY SUCTION SYSTEM

## (undated) DEVICE — TIP, SUCTION, YANKAUER, FLEXIBLE

## (undated) DEVICE — TUBING, PUMP MAIN 16FT STERILE

## (undated) DEVICE — SUTURE, STRATAFIX, 3-0, SPIRAL MONOCRYL PLUS, PS, 70CM, UNDYED

## (undated) DEVICE — BANDAGE, ELASTIC, MATRIX, SELF-CLOSURE, 6 IN X 5 YD, LF

## (undated) DEVICE — GLOVE, SURGICAL, PROTEXIS PI ORTHO, 8.0, PF, LF

## (undated) DEVICE — DRESSING, ABDOMINAL, WET PRUF, TENDERSORB, 5 X 9 IN, STERILE

## (undated) DEVICE — SOLUTION, IRRIGATION, STERILE WATER, 1000 ML, POUR BOTTLE

## (undated) DEVICE — SOLUTION, IRRIGATION, SODIUM CHLORIDE 0.9%, 1000 ML, POUR BOTTLE

## (undated) DEVICE — SUTURE, VICRYL, 2-0, 18 IN CP-2, UNDYED

## (undated) DEVICE — TOWEL PACK, STERILE, 4/PACK, BLUE

## (undated) DEVICE — SPLINT, KNEE, 3-PANEL, 20IN

## (undated) DEVICE — BANDAGE, COFLEX, 4 X 5 YDS, FOAM TAN, STERILE, LF

## (undated) DEVICE — TUBING, SUCTION, CONNECTING, 9/32 X 10FT, LF

## (undated) DEVICE — NEEDLE, SPINAL, QUINCKE, 18 G X 3.5 IN, PINK HUB

## (undated) DEVICE — SUTURE, VICRYL, 0, 36 IN, CT-1, UNDYED

## (undated) DEVICE — STRIP, SKIN CLOSURE, STERI STRIP, REINFORCED, 0.5 X 4 IN

## (undated) DEVICE — SUTURE, MONOCRYL, 3-0, 27 IN, PS-2, UNDYED

## (undated) DEVICE — GLOVE, SURGICAL, PROTEXIS PI BLUE W/NEUTHERA, 8.0, PF, LF

## (undated) DEVICE — ADHESIVE, SKIN, DERMABOND ADVANCED, 15CM, PEN-STYLE

## (undated) DEVICE — BLADE, OSCILLATING/SAGITTAL, 25MM X 9MM

## (undated) DEVICE — DRESSING, GAUZE, PETROLATUM, PATCH, XEROFORM, 1 X 8 IN, STERILE